# Patient Record
Sex: MALE | Race: WHITE | NOT HISPANIC OR LATINO
[De-identification: names, ages, dates, MRNs, and addresses within clinical notes are randomized per-mention and may not be internally consistent; named-entity substitution may affect disease eponyms.]

---

## 2019-12-27 PROBLEM — Z00.00 ENCOUNTER FOR PREVENTIVE HEALTH EXAMINATION: Status: ACTIVE | Noted: 2019-12-27

## 2020-07-06 ENCOUNTER — TRANSCRIPTION ENCOUNTER (OUTPATIENT)
Age: 70
End: 2020-07-06

## 2023-06-25 ENCOUNTER — TRANSCRIPTION ENCOUNTER (OUTPATIENT)
Age: 73
End: 2023-06-25

## 2023-06-25 ENCOUNTER — INPATIENT (INPATIENT)
Facility: HOSPITAL | Age: 73
LOS: 1 days | Discharge: HOME HEALTH | DRG: 42 | End: 2023-06-27
Attending: PSYCHIATRY & NEUROLOGY | Admitting: PSYCHIATRY & NEUROLOGY
Payer: MEDICARE

## 2023-06-25 VITALS
HEIGHT: 69 IN | RESPIRATION RATE: 18 BRPM | WEIGHT: 147.93 LBS | TEMPERATURE: 98 F | HEART RATE: 66 BPM | SYSTOLIC BLOOD PRESSURE: 188 MMHG | DIASTOLIC BLOOD PRESSURE: 91 MMHG | OXYGEN SATURATION: 99 %

## 2023-06-25 DIAGNOSIS — I63.9 CEREBRAL INFARCTION, UNSPECIFIED: ICD-10-CM

## 2023-06-25 DIAGNOSIS — E78.5 HYPERLIPIDEMIA, UNSPECIFIED: ICD-10-CM

## 2023-06-25 DIAGNOSIS — R73.03 PREDIABETES: ICD-10-CM

## 2023-06-25 DIAGNOSIS — Z91.018 ALLERGY TO OTHER FOODS: ICD-10-CM

## 2023-06-25 DIAGNOSIS — H53.461 HOMONYMOUS BILATERAL FIELD DEFECTS, RIGHT SIDE: ICD-10-CM

## 2023-06-25 DIAGNOSIS — I10 ESSENTIAL (PRIMARY) HYPERTENSION: ICD-10-CM

## 2023-06-25 DIAGNOSIS — Z79.899 OTHER LONG TERM (CURRENT) DRUG THERAPY: ICD-10-CM

## 2023-06-25 DIAGNOSIS — K21.9 GASTRO-ESOPHAGEAL REFLUX DISEASE WITHOUT ESOPHAGITIS: ICD-10-CM

## 2023-06-25 DIAGNOSIS — R29.702 NIHSS SCORE 2: ICD-10-CM

## 2023-06-25 DIAGNOSIS — Z79.82 LONG TERM (CURRENT) USE OF ASPIRIN: ICD-10-CM

## 2023-06-25 DIAGNOSIS — Z98.49 CATARACT EXTRACTION STATUS, UNSPECIFIED EYE: Chronic | ICD-10-CM

## 2023-06-25 DIAGNOSIS — Z98.890 OTHER SPECIFIED POSTPROCEDURAL STATES: Chronic | ICD-10-CM

## 2023-06-25 LAB
ALBUMIN SERPL ELPH-MCNC: 4.5 G/DL — SIGNIFICANT CHANGE UP (ref 3.5–5.2)
ALP SERPL-CCNC: 88 U/L — SIGNIFICANT CHANGE UP (ref 30–115)
ALT FLD-CCNC: 19 U/L — SIGNIFICANT CHANGE UP (ref 0–41)
ANION GAP SERPL CALC-SCNC: 12 MMOL/L — SIGNIFICANT CHANGE UP (ref 7–14)
APTT BLD: 26.6 SEC — LOW (ref 27–39.2)
AST SERPL-CCNC: 21 U/L — SIGNIFICANT CHANGE UP (ref 0–41)
BASOPHILS # BLD AUTO: 0.03 K/UL — SIGNIFICANT CHANGE UP (ref 0–0.2)
BASOPHILS NFR BLD AUTO: 0.5 % — SIGNIFICANT CHANGE UP (ref 0–1)
BILIRUB SERPL-MCNC: 0.4 MG/DL — SIGNIFICANT CHANGE UP (ref 0.2–1.2)
BUN SERPL-MCNC: 16 MG/DL — SIGNIFICANT CHANGE UP (ref 10–20)
CALCIUM SERPL-MCNC: 8.7 MG/DL — SIGNIFICANT CHANGE UP (ref 8.4–10.5)
CHLORIDE SERPL-SCNC: 107 MMOL/L — SIGNIFICANT CHANGE UP (ref 98–110)
CO2 SERPL-SCNC: 24 MMOL/L — SIGNIFICANT CHANGE UP (ref 17–32)
CREAT SERPL-MCNC: 1 MG/DL — SIGNIFICANT CHANGE UP (ref 0.7–1.5)
EGFR: 79 ML/MIN/1.73M2 — SIGNIFICANT CHANGE UP
EOSINOPHIL # BLD AUTO: 0.03 K/UL — SIGNIFICANT CHANGE UP (ref 0–0.7)
EOSINOPHIL NFR BLD AUTO: 0.5 % — SIGNIFICANT CHANGE UP (ref 0–8)
GLUCOSE SERPL-MCNC: 104 MG/DL — HIGH (ref 70–99)
HCT VFR BLD CALC: 42.4 % — SIGNIFICANT CHANGE UP (ref 42–52)
HGB BLD-MCNC: 14.9 G/DL — SIGNIFICANT CHANGE UP (ref 14–18)
IMM GRANULOCYTES NFR BLD AUTO: 0.2 % — SIGNIFICANT CHANGE UP (ref 0.1–0.3)
INR BLD: 0.97 RATIO — SIGNIFICANT CHANGE UP (ref 0.65–1.3)
LYMPHOCYTES # BLD AUTO: 1.12 K/UL — LOW (ref 1.2–3.4)
LYMPHOCYTES # BLD AUTO: 17.6 % — LOW (ref 20.5–51.1)
MCHC RBC-ENTMCNC: 32.5 PG — HIGH (ref 27–31)
MCHC RBC-ENTMCNC: 35.1 G/DL — SIGNIFICANT CHANGE UP (ref 32–37)
MCV RBC AUTO: 92.4 FL — SIGNIFICANT CHANGE UP (ref 80–94)
MONOCYTES # BLD AUTO: 0.51 K/UL — SIGNIFICANT CHANGE UP (ref 0.1–0.6)
MONOCYTES NFR BLD AUTO: 8 % — SIGNIFICANT CHANGE UP (ref 1.7–9.3)
NEUTROPHILS # BLD AUTO: 4.67 K/UL — SIGNIFICANT CHANGE UP (ref 1.4–6.5)
NEUTROPHILS NFR BLD AUTO: 73.2 % — SIGNIFICANT CHANGE UP (ref 42.2–75.2)
NRBC # BLD: 0 /100 WBCS — SIGNIFICANT CHANGE UP (ref 0–0)
PLATELET # BLD AUTO: 205 K/UL — SIGNIFICANT CHANGE UP (ref 130–400)
PMV BLD: 10.7 FL — HIGH (ref 7.4–10.4)
POTASSIUM SERPL-MCNC: 4.3 MMOL/L — SIGNIFICANT CHANGE UP (ref 3.5–5)
POTASSIUM SERPL-SCNC: 4.3 MMOL/L — SIGNIFICANT CHANGE UP (ref 3.5–5)
PROT SERPL-MCNC: 7 G/DL — SIGNIFICANT CHANGE UP (ref 6–8)
PROTHROM AB SERPL-ACNC: 11 SEC — SIGNIFICANT CHANGE UP (ref 9.95–12.87)
RBC # BLD: 4.59 M/UL — LOW (ref 4.7–6.1)
RBC # FLD: 13 % — SIGNIFICANT CHANGE UP (ref 11.5–14.5)
SODIUM SERPL-SCNC: 143 MMOL/L — SIGNIFICANT CHANGE UP (ref 135–146)
TROPONIN T SERPL-MCNC: <0.01 NG/ML — SIGNIFICANT CHANGE UP
WBC # BLD: 6.37 K/UL — SIGNIFICANT CHANGE UP (ref 4.8–10.8)
WBC # FLD AUTO: 6.37 K/UL — SIGNIFICANT CHANGE UP (ref 4.8–10.8)

## 2023-06-25 PROCEDURE — 83695 ASSAY OF LIPOPROTEIN(A): CPT

## 2023-06-25 PROCEDURE — 70498 CT ANGIOGRAPHY NECK: CPT | Mod: 26,MA

## 2023-06-25 PROCEDURE — G1004: CPT

## 2023-06-25 PROCEDURE — 97116 GAIT TRAINING THERAPY: CPT | Mod: GP

## 2023-06-25 PROCEDURE — 80061 LIPID PANEL: CPT

## 2023-06-25 PROCEDURE — 36415 COLL VENOUS BLD VENIPUNCTURE: CPT

## 2023-06-25 PROCEDURE — 92610 EVALUATE SWALLOWING FUNCTION: CPT | Mod: GN

## 2023-06-25 PROCEDURE — C1764: CPT

## 2023-06-25 PROCEDURE — 84100 ASSAY OF PHOSPHORUS: CPT

## 2023-06-25 PROCEDURE — 86901 BLOOD TYPING SEROLOGIC RH(D): CPT

## 2023-06-25 PROCEDURE — 87635 SARS-COV-2 COVID-19 AMP PRB: CPT

## 2023-06-25 PROCEDURE — 86850 RBC ANTIBODY SCREEN: CPT

## 2023-06-25 PROCEDURE — 83036 HEMOGLOBIN GLYCOSYLATED A1C: CPT

## 2023-06-25 PROCEDURE — 99285 EMERGENCY DEPT VISIT HI MDM: CPT | Mod: GC

## 2023-06-25 PROCEDURE — 85025 COMPLETE CBC W/AUTO DIFF WBC: CPT

## 2023-06-25 PROCEDURE — 97162 PT EVAL MOD COMPLEX 30 MIN: CPT | Mod: GP

## 2023-06-25 PROCEDURE — 0042T: CPT | Mod: MA

## 2023-06-25 PROCEDURE — 85027 COMPLETE CBC AUTOMATED: CPT

## 2023-06-25 PROCEDURE — 70551 MRI BRAIN STEM W/O DYE: CPT | Mod: 26

## 2023-06-25 PROCEDURE — 82607 VITAMIN B-12: CPT

## 2023-06-25 PROCEDURE — 93306 TTE W/DOPPLER COMPLETE: CPT

## 2023-06-25 PROCEDURE — 80048 BASIC METABOLIC PNL TOTAL CA: CPT

## 2023-06-25 PROCEDURE — 33285 INSJ SUBQ CAR RHYTHM MNTR: CPT

## 2023-06-25 PROCEDURE — 83735 ASSAY OF MAGNESIUM: CPT

## 2023-06-25 PROCEDURE — 84443 ASSAY THYROID STIM HORMONE: CPT

## 2023-06-25 PROCEDURE — 97530 THERAPEUTIC ACTIVITIES: CPT | Mod: GP

## 2023-06-25 PROCEDURE — 97167 OT EVAL HIGH COMPLEX 60 MIN: CPT | Mod: GO

## 2023-06-25 PROCEDURE — 70496 CT ANGIOGRAPHY HEAD: CPT | Mod: 26,MA

## 2023-06-25 PROCEDURE — 70551 MRI BRAIN STEM W/O DYE: CPT | Mod: ME

## 2023-06-25 PROCEDURE — 70450 CT HEAD/BRAIN W/O DYE: CPT | Mod: 26,MA,59

## 2023-06-25 PROCEDURE — 86900 BLOOD TYPING SEROLOGIC ABO: CPT

## 2023-06-25 PROCEDURE — 86803 HEPATITIS C AB TEST: CPT

## 2023-06-25 RX ORDER — ENOXAPARIN SODIUM 100 MG/ML
40 INJECTION SUBCUTANEOUS EVERY 24 HOURS
Refills: 0 | Status: DISCONTINUED | OUTPATIENT
Start: 2023-06-25 | End: 2023-06-27

## 2023-06-25 RX ORDER — PANTOPRAZOLE SODIUM 20 MG/1
40 TABLET, DELAYED RELEASE ORAL
Refills: 0 | Status: DISCONTINUED | OUTPATIENT
Start: 2023-06-25 | End: 2023-06-27

## 2023-06-25 RX ORDER — CLOPIDOGREL BISULFATE 75 MG/1
300 TABLET, FILM COATED ORAL ONCE
Refills: 0 | Status: COMPLETED | OUTPATIENT
Start: 2023-06-25 | End: 2023-06-25

## 2023-06-25 RX ORDER — ATORVASTATIN CALCIUM 80 MG/1
80 TABLET, FILM COATED ORAL AT BEDTIME
Refills: 0 | Status: DISCONTINUED | OUTPATIENT
Start: 2023-06-25 | End: 2023-06-27

## 2023-06-25 RX ORDER — ASPIRIN/CALCIUM CARB/MAGNESIUM 324 MG
325 TABLET ORAL ONCE
Refills: 0 | Status: COMPLETED | OUTPATIENT
Start: 2023-06-25 | End: 2023-06-25

## 2023-06-25 RX ADMIN — PANTOPRAZOLE SODIUM 40 MILLIGRAM(S): 20 TABLET, DELAYED RELEASE ORAL at 18:37

## 2023-06-25 RX ADMIN — Medication 1 TABLET(S): at 18:37

## 2023-06-25 RX ADMIN — ATORVASTATIN CALCIUM 80 MILLIGRAM(S): 80 TABLET, FILM COATED ORAL at 22:40

## 2023-06-25 RX ADMIN — CLOPIDOGREL BISULFATE 300 MILLIGRAM(S): 75 TABLET, FILM COATED ORAL at 18:36

## 2023-06-25 RX ADMIN — Medication 325 MILLIGRAM(S): at 18:35

## 2023-06-25 NOTE — ED PROVIDER NOTE - OBJECTIVE STATEMENT
74yo male PMHx HLD and TIA in 2020 ("double vision") presenting with perioral numbness x4 days after having a dental implant placed. No weakness of the face, no head or facial pain, no other numbness or weakness in the body, no difficulty speaking.

## 2023-06-25 NOTE — ED PROVIDER NOTE - PHYSICAL EXAMINATION
VITAL SIGNS: I have reviewed nursing notes and confirm.  CONSTITUTIONAL: Well-appearing, non-toxic, in NAD  SKIN: Warm dry, normal skin turgor  HEAD: NCAT  EYES: No conjunctival injection, scleral anicteric  ENT: Moist mucous membranes, normal pharynx with no erythema or exudates  NECK: Supple; full ROM. Nontender. No cervical LAD  CARD: RRR, no murmurs, rubs or gallops  RESP: Clear to ausculation bilaterally.  No rales, rhonchi, or wheezing.  ABD: Soft, non-distended, non-tender, no rebound or guarding. No CVA tenderness  EXT: Full ROM, no bony tenderness, no pedal edema, no calf tenderness  NEURO: Normal motor. Subjective decreased sensation to touch of the right corner of the mouth and philthrum, but discrimination intact. CN II-XII otherwise grossly intact. No pronator drift. Normal gait.  PSYCH: Cooperative, appropriate.

## 2023-06-25 NOTE — H&P ADULT - ASSESSMENT
This is 74yo male with pmhx of HLD, GERD and ?TIA in 2020 while in [place]("double vision") presenting with right sided perioral numbness for last 4 days. NIHSS 1 on arrival. Found to have an acute infarct in left thalamic area on CTH, most likely due to small vessel disease. Currenlty admitted under neurology for further work up.     #Left Thalamic Stroke  - CTH: Acute Left Thalamic infarct  - CTA:  - Load with ASA and Plavix  - c/w DAPT for 21 days then aspirin thereafter  - Obtain TTE  - Obtain A1c, TSH, Lipid Panel  - Obtain MR brain non cont   - Start Lipitor 80mg daily  - SBP goal 120-160 for now  - PT/Physiatry eval  - Speech and swallow eval    #HLD  - Start Lipitor 80mg daily    #GERD  - c/w home omeprazole 40mg daily      Misc  Diet: DASH  DVT ppx: Lovenox  GI ppx: Protonix  Activity: IAT  Code: Full    Dispo: Home     This is 72yo male with pmhx of HLD, GERD and ?TIA in 2020 while in [place]("double vision") presenting with right sided perioral numbness for last 4 days. NIHSS 1 on arrival. Found to have an acute infarct in left thalamic area on CTH, most likely due to small vessel disease. Currenlty admitted under neurology for further work up.     #Left Thalamic Stroke  - CTH: Acute Left Thalamic infarct  - CTA: No vascular occlusion, aneurysm or malformation  - Load with ASA and Plavix  - c/w DAPT for 21 days then aspirin thereafter  - Obtain TTE  - Obtain A1c, TSH, Lipid Panel  - Obtain MR brain non cont   - Start Lipitor 80mg daily  - SBP goal 120-160 for now  - PT/Physiatry eval  - Speech and swallow eval    #HLD  - Start Lipitor 80mg daily    #GERD  - c/w home omeprazole 40mg daily      Misc  Diet: DASH  DVT ppx: Lovenox  GI ppx: Protonix  Activity: IAT  Code: Full    Dispo: Home     This is 74yo male with pmhx of HLD, GERD and ?TIA in 2020 while in [place]("double vision") presenting with right sided perioral numbness for last 4 days. NIHSS 1 on arrival. Found to have an acute infarct in left thalamic area on CTH, most likely cardioembolic vs small vessel disease. Currenlty admitted under neurology for further work up.     #Left Thalamic Stroke  - CTH: Acute Left Thalamic infarct  - CTA: No vascular occlusion, aneurysm or malformation  - Load with ASA and Plavix  - c/w DAPT for 21 days then aspirin thereafter  - Obtain TTE  - Obtain A1c, TSH, Lipid Panel  - Obtain MR brain non cont   - Start Lipitor 80mg daily  - SBP goal 120-160 for now  - PT/Physiatry eval  - Speech and swallow eval    #HLD  - Start Lipitor 80mg daily    #GERD  - c/w home omeprazole 40mg daily      Misc  Diet: DASH  DVT ppx: Lovenox  GI ppx: Protonix  Activity: IAT  Code: Full    Dispo: Home

## 2023-06-25 NOTE — ED ADULT NURSE NOTE - NSFALLUNIVINTERV_ED_ALL_ED
Bed/Stretcher in lowest position, wheels locked, appropriate side rails in place/Call bell, personal items and telephone in reach/Instruct patient to call for assistance before getting out of bed/chair/stretcher/Non-slip footwear applied when patient is off stretcher/[place] to call system/Physically safe environment - no spills, clutter or unnecessary equipment/Purposeful proactive rounding/Room/bathroom lighting operational, light cord in reach

## 2023-06-25 NOTE — ED PROVIDER NOTE - CLINICAL SUMMARY MEDICAL DECISION MAKING FREE TEXT BOX
73-year-old man, history of hyperlipidemia, TIA in 2020 complains of numbness to the right perioral region 4 days after having dental implant.  No other deficits.  Pt's Wife is undergoing treatment for pancreatic CA at [facility] so patient came to the ED for evaluation prior to Wife's next appointment which is scheduled for later in the week in [place].  Vital signs, exam as noted, no focal deficits.  Labs okay, but head CT consistent with acute infarct.  Results were discussed with patient, neurology was consulted, and he was admitted to stroke unit for further evaluation and treatment.

## 2023-06-25 NOTE — H&P ADULT - NSHPLABSRESULTS_GEN_ALL_CORE
LABS:                        14.9   6.37  )-----------( 205      ( 25 Jun 2023 14:22 )             42.4     06-25    143  |  107  |  16  ----------------------------<  104<H>  4.3   |  24  |  1.0    Ca    8.7      25 Jun 2023 14:22    TPro  7.0  /  Alb  4.5  /  TBili  0.4  /  DBili  x   /  AST  21  /  ALT  19  /  AlkPhos  88  06-25    PT/INR - ( 25 Jun 2023 14:22 )   PT: 11.00 sec;   INR: 0.97 ratio         PTT - ( 25 Jun 2023 14:22 )  PTT:26.6 sec    < from: CT Angio Neck w/ IV Cont (06.25.23 @ 16:56) >    No vascular occlusion, aneurysm or malformation.    Redemonstrated focal hypodensity in the left thalamus, likely infarction.   Can correlate with MRI if warranted.    < end of copied text >    < from: CT Head No Cont (06.25.23 @ 13:54) >    Hypodensity involving the left thalamus concerning for an acute infarct.   No acute hemorrhage.    < end of copied text >

## 2023-06-25 NOTE — H&P ADULT - NSICDXFAMILYHX_GEN_ALL_CORE_FT
FAMILY HISTORY:  Father  Still living? No  Family history of emphysema, Age at diagnosis: Age Unknown    Mother  Still living? No  Family history of CHF (congestive heart failure), Age at diagnosis: Age Unknown

## 2023-06-25 NOTE — H&P ADULT - ATTENDING COMMENTS
Pt is a 74 yo M with PMH of HTN, HLD, prior vertical diplopia (treated with prednisone while in [place] and it improved, uncertain if imaging showed a stroke at that time), who presented with right perioral and right thumb hypoesthesia. NIHSS 2 for right homonymous hemianopia.     Impr: scattered left PCA territory acute ischemic strokes (thalamus, occipital/temporal region)  CTA head/neck with left P2 stenosis vs occlusion with distal reconstitution  TTE with EF 60-65%  Etiology: likely to ICAD, however may also consider cardioembolic  Recommend ILR placement  PTA: none-> DAPT x 90 days then ASA monotherapy and high intensity statin  Discussed driving restrictions  PT/OT/ST, tele, -160, dispo planning

## 2023-06-25 NOTE — H&P ADULT - HISTORY OF PRESENT ILLNESS
72yo male PMHx HLD, GERD and ?TIA in 2020 while in [place]("double vision") presenting with right sided perioral numbness for last 4 days. Patient reports undergoing root canal w/ possible dental implant placed. After the procedure patient reports feeling some numbness in his right lower lips which evolved over the last days to the upper lip and right side lower jaw area. This morning, patient reports feeling some numbness in his right thumb area. Denies any other symptoms. No weakness of the face, no head or facial pain, no difficulty speaking, no focal weakness, no difficulty ambulating or falls. No nausea or vomiting. Reports feeling fine otherwise.  In ED, vitals: /91 HR 66 RR 18 T 98  CTH: acute left thalamus infarct  CTA: no LVO or significant stenosis - prelim read    Patient admitted under neurology for stroke work up.

## 2023-06-25 NOTE — H&P ADULT - NSHPPHYSICALEXAM_GEN_ALL_CORE
PHYSICAL EXAMINATION:  General: Well-developed, well nourished, in no acute distress.  Eyes: Conjunctiva and sclera clear.  Neurologic:  - Mental Status:  Alert, awake, oriented to person, place, and time; Speech is fluent with intact naming, repetition, and comprehension; Good overall fund of knowledge;   - Cranial Nerves II-XII:    II:  Visual fields are full to confronation; Pupils are equal, round, and reactive to light;  III, IV, VI:  Extraocular movements are intact without nystagmus.  V:  decreased right sided facial sensation in v2,v3 perioral area  VII:  Face is symmetric with normal eye closure and smile  VIII:  Hearing is intact to finger rub.  IX, X:  Uvula is midline and soft palate rises symmetrically  XI:  Head turning and shoulder shrug are intact.  XII:  Tongue protrudes in the midline.  - Motor:  Strength is 5/5 throughout.  There is no prontator drift.  Normal muscle bulk and tone throughout.  - Reflexes:  2+ and symmetric at the biceps, triceps, brachioradialis, knees, and ankles.  Plantar responses flexor.  - Sensory:  Intact throughout to vibration, mild numbness in right thumb  - Coordination:  Finger-nose-finger  without dysmetria.  Rapid alternating hand movements intact.  - Gait: Narrow based  NIHSS 1
[Follow-Up] : a follow-up visit for
[Minimal Change Nephrosis] : minimal change nephrosis
[Mother] : mother
[Patient] : patient
,DirectAddress_Unknown,DirectAddress_Unknown,DirectAddress_Unknown

## 2023-06-26 LAB
A1C WITH ESTIMATED AVERAGE GLUCOSE RESULT: 6 % — HIGH (ref 4–5.6)
ANION GAP SERPL CALC-SCNC: 13 MMOL/L — SIGNIFICANT CHANGE UP (ref 7–14)
BASOPHILS # BLD AUTO: 0.05 K/UL — SIGNIFICANT CHANGE UP (ref 0–0.2)
BASOPHILS NFR BLD AUTO: 0.7 % — SIGNIFICANT CHANGE UP (ref 0–1)
BUN SERPL-MCNC: 16 MG/DL — SIGNIFICANT CHANGE UP (ref 10–20)
CALCIUM SERPL-MCNC: 8.5 MG/DL — SIGNIFICANT CHANGE UP (ref 8.4–10.5)
CHLORIDE SERPL-SCNC: 107 MMOL/L — SIGNIFICANT CHANGE UP (ref 98–110)
CHOLEST SERPL-MCNC: 233 MG/DL — HIGH
CO2 SERPL-SCNC: 25 MMOL/L — SIGNIFICANT CHANGE UP (ref 17–32)
CREAT SERPL-MCNC: 1 MG/DL — SIGNIFICANT CHANGE UP (ref 0.7–1.5)
EGFR: 79 ML/MIN/1.73M2 — SIGNIFICANT CHANGE UP
EOSINOPHIL # BLD AUTO: 0.31 K/UL — SIGNIFICANT CHANGE UP (ref 0–0.7)
EOSINOPHIL NFR BLD AUTO: 4.6 % — SIGNIFICANT CHANGE UP (ref 0–8)
ESTIMATED AVERAGE GLUCOSE: 126 MG/DL — HIGH (ref 68–114)
GLUCOSE SERPL-MCNC: 87 MG/DL — SIGNIFICANT CHANGE UP (ref 70–99)
HCT VFR BLD CALC: 38.8 % — LOW (ref 42–52)
HCV AB S/CO SERPL IA: 0.04 COI — SIGNIFICANT CHANGE UP
HCV AB SERPL-IMP: SIGNIFICANT CHANGE UP
HDLC SERPL-MCNC: 46 MG/DL — SIGNIFICANT CHANGE UP
HGB BLD-MCNC: 13.9 G/DL — LOW (ref 14–18)
IMM GRANULOCYTES NFR BLD AUTO: 0.3 % — SIGNIFICANT CHANGE UP (ref 0.1–0.3)
LIPID PNL WITH DIRECT LDL SERPL: 169 MG/DL — HIGH
LYMPHOCYTES # BLD AUTO: 1.74 K/UL — SIGNIFICANT CHANGE UP (ref 1.2–3.4)
LYMPHOCYTES # BLD AUTO: 25.8 % — SIGNIFICANT CHANGE UP (ref 20.5–51.1)
MCHC RBC-ENTMCNC: 32.8 PG — HIGH (ref 27–31)
MCHC RBC-ENTMCNC: 35.8 G/DL — SIGNIFICANT CHANGE UP (ref 32–37)
MCV RBC AUTO: 91.5 FL — SIGNIFICANT CHANGE UP (ref 80–94)
MONOCYTES # BLD AUTO: 0.67 K/UL — HIGH (ref 0.1–0.6)
MONOCYTES NFR BLD AUTO: 9.9 % — HIGH (ref 1.7–9.3)
NEUTROPHILS # BLD AUTO: 3.96 K/UL — SIGNIFICANT CHANGE UP (ref 1.4–6.5)
NEUTROPHILS NFR BLD AUTO: 58.7 % — SIGNIFICANT CHANGE UP (ref 42.2–75.2)
NON HDL CHOLESTEROL: 187 MG/DL — HIGH
NRBC # BLD: 0 /100 WBCS — SIGNIFICANT CHANGE UP (ref 0–0)
PLATELET # BLD AUTO: 181 K/UL — SIGNIFICANT CHANGE UP (ref 130–400)
PMV BLD: 11.4 FL — HIGH (ref 7.4–10.4)
POTASSIUM SERPL-MCNC: 4.2 MMOL/L — SIGNIFICANT CHANGE UP (ref 3.5–5)
POTASSIUM SERPL-SCNC: 4.2 MMOL/L — SIGNIFICANT CHANGE UP (ref 3.5–5)
RBC # BLD: 4.24 M/UL — LOW (ref 4.7–6.1)
RBC # FLD: 13.1 % — SIGNIFICANT CHANGE UP (ref 11.5–14.5)
SARS-COV-2 RNA SPEC QL NAA+PROBE: SIGNIFICANT CHANGE UP
SODIUM SERPL-SCNC: 145 MMOL/L — SIGNIFICANT CHANGE UP (ref 135–146)
TRIGL SERPL-MCNC: 90 MG/DL — SIGNIFICANT CHANGE UP
TSH SERPL-MCNC: 6.94 UIU/ML — HIGH (ref 0.27–4.2)
WBC # BLD: 6.75 K/UL — SIGNIFICANT CHANGE UP (ref 4.8–10.8)
WBC # FLD AUTO: 6.75 K/UL — SIGNIFICANT CHANGE UP (ref 4.8–10.8)

## 2023-06-26 PROCEDURE — 99223 1ST HOSP IP/OBS HIGH 75: CPT

## 2023-06-26 PROCEDURE — 99223 1ST HOSP IP/OBS HIGH 75: CPT | Mod: 57,25

## 2023-06-26 PROCEDURE — 93306 TTE W/DOPPLER COMPLETE: CPT | Mod: 26

## 2023-06-26 PROCEDURE — 33285 INSJ SUBQ CAR RHYTHM MNTR: CPT

## 2023-06-26 RX ORDER — CLOPIDOGREL BISULFATE 75 MG/1
75 TABLET, FILM COATED ORAL DAILY
Refills: 0 | Status: DISCONTINUED | OUTPATIENT
Start: 2023-06-26 | End: 2023-06-27

## 2023-06-26 RX ORDER — ASPIRIN/CALCIUM CARB/MAGNESIUM 324 MG
81 TABLET ORAL DAILY
Refills: 0 | Status: DISCONTINUED | OUTPATIENT
Start: 2023-06-26 | End: 2023-06-27

## 2023-06-26 RX ADMIN — Medication 1 TABLET(S): at 11:37

## 2023-06-26 RX ADMIN — CLOPIDOGREL BISULFATE 75 MILLIGRAM(S): 75 TABLET, FILM COATED ORAL at 11:36

## 2023-06-26 RX ADMIN — Medication 81 MILLIGRAM(S): at 11:36

## 2023-06-26 RX ADMIN — ATORVASTATIN CALCIUM 80 MILLIGRAM(S): 80 TABLET, FILM COATED ORAL at 21:34

## 2023-06-26 RX ADMIN — PANTOPRAZOLE SODIUM 40 MILLIGRAM(S): 20 TABLET, DELAYED RELEASE ORAL at 05:41

## 2023-06-26 RX ADMIN — ENOXAPARIN SODIUM 40 MILLIGRAM(S): 100 INJECTION SUBCUTANEOUS at 05:41

## 2023-06-26 NOTE — CONSULT NOTE ADULT - SUBJECTIVE AND OBJECTIVE BOX
Patient is a 73y old  Male who presents with a chief complaint of Left PCA Stroke (2023 15:21)        HPI:  74yo male PMHx HLD, GERD and ?TIA in  while in [place]("double vision") presenting with right sided perioral numbness for last 4 days. Patient reports undergoing root canal w/ possible dental implant placed. After the procedure patient reports feeling some numbness in his right lower lips which evolved over the last days to the upper lip and right side lower jaw area. This morning, patient reports feeling some numbness in his right thumb area. Denies any other symptoms. No weakness of the face, no head or facial pain, no difficulty speaking, no focal weakness, no difficulty ambulating or falls. No nausea or vomiting. Reports feeling fine otherwise.  In ED, vitals: /91 HR 66 RR 18 T 98  CTH: acute left thalamus infarct  CTA: no LVO or significant stenosis - prelim read    Patient admitted under neurology for stroke work up.  (2023 17:41)      Electrophysiology:  73y Male    REVIEW OF SYSTEMS    [ ] A ten-point review of systems was otherwise negative except as noted.  [ ] Due to altered mental status/intubation, subjective information were not able to be obtained from the patient. History was obtained, to the extent possible, from review of the chart and collateral sources of information.      PAST MEDICAL & SURGICAL HISTORY:  Hyperlipidemia      H/O hernia repair      S/P cataract surgery          Home Medications:  Multiple Vitamins oral tablet: 1 orally once a day (2023 18:15)  omeprazole 40 mg oral delayed release capsule: 1 orally once a day (2023 18:15)      Allergies:  apple (Hives; Urticaria)  Cherries (Anaphylaxis; Rash; Urticaria; Hives)  Nuts (Unknown)  No Known Drug Allergies      FAMILY HISTORY:  Family history of CHF (congestive heart failure) (Mother)    Family history of emphysema (Father)        SOCIAL HISTORY:    CIGARETTES:  ALCOHOL:  MARIJUANA:  ILLICIT DRUGS:        PREVIOUS DIAGNOSTIC TESTING:      ECHO  FINDINGS:    STRESS  FINDINGS:    CATHETERIZATION  FINDINGS:    ELECTROPHYSIOLOGY STUDY  FINDINGS:    CAROTID ULTRASOUND:  FINDINGS    VENOUS DUPLEX SCAN:  FINDINGS:    CHEST CT PULMONARY ANGIO with IV Contrast:  FINDINGS:      MEDICATIONS  (STANDING):  aspirin  chewable 81 milliGRAM(s) Oral daily  atorvastatin 80 milliGRAM(s) Oral at bedtime  clopidogrel Tablet 75 milliGRAM(s) Oral daily  enoxaparin Injectable 40 milliGRAM(s) SubCutaneous every 24 hours  multivitamin 1 Tablet(s) Oral daily  pantoprazole    Tablet 40 milliGRAM(s) Oral before breakfast    MEDICATIONS  (PRN):      Vital Signs Last 24 Hrs  T(C): 36.7 (2023 16:06), Max: 36.7 (2023 16:06)  T(F): 98 (2023 16:06), Max: 98 (2023 16:06)  HR: 72 (2023 16:06) (53 - 72)  BP: 131/81 (2023 16:06) (131/81 - 182/85)  BP(mean): 97 (2023 05:30) (97 - 129)  RR: 18 (2023 16:06) (18 - 18)  SpO2: 95% (2023 16:06) (94% - 100%)    Parameters below as of 2023 16:06  Patient On (Oxygen Delivery Method): room air        PHYSICAL EXAM:    GENERAL: In no apparent distress, well nourished, and hydrated.  HEAD:  Atraumatic, Normocephalic  EYES: EOMI, PERRLA, conjunctiva and sclera clear  NECK: Supple and normal thyroid.  No JVD or carotid bruit.  Carotid pulse is 2+ bilaterally.  HEART: Regular rate and rhythm; No murmurs, rubs, or gallops.  PULMONARY: Clear to auscultation and perfusion.  No rales, wheezing, or rhonchi bilaterally.  ABDOMEN: Soft, Nontender, Nondistended; Bowel sounds present  EXTREMITIES:  2+ Peripheral Pulses, No clubbing, cyanosis, or edema  NEUROLOGICAL: Grossly nonfocal    I&O's Detail    Daily     Daily     INTERPRETATION OF TELEMETRY:    EC Lead ECG:   Ventricular Rate 53 BPM    Atrial Rate 53 BPM    P-R Interval 178 ms    QRS Duration 82 ms    Q-T Interval 444 ms    QTC Calculation(Bazett) 416 ms    P Axis 23 degrees    R Axis 27 degrees    T Axis 14 degrees    Diagnosis Line Sinus bradycardia  Possible Inferior infarct , age undetermined  Abnormal ECG    Confirmed by Juan Simons ([id number]) on 2023 4:15:55 PM (23 @ 15:16)        LABS:                        13.9   6.75  )-----------( 181      ( 2023 05:55 )             38.8         145  |  107  |  16  ----------------------------<  87  4.2   |  25  |  1.0    Ca    8.5      2023 05:55    TPro  7.0  /  Alb  4.5  /  TBili  0.4  /  DBili  x   /  AST  21  /  ALT  19  /  AlkPhos  88      CARDIAC MARKERS ( 2023 14:22 )  x     / <0.01 ng/mL / x     / x     / x          PT/INR - ( 2023 14:22 )   PT: 11.00 sec;   INR: 0.97 ratio         PTT - ( 2023 14:22 )  PTT:26.6 sec  Urinalysis Basic - ( 2023 05:55 )    Color: x / Appearance: x / SG: x / pH: x  Gluc: 87 mg/dL / Ketone: x  / Bili: x / Urobili: x   Blood: x / Protein: x / Nitrite: x   Leuk Esterase: x / RBC: x / WBC x   Sq Epi: x / Non Sq Epi: x / Bacteria: x      BNP  Thyroid Stimulating Hormone, Serum: 6.94 uIU/mL [0.27 - 4.20] (23 @ 05:55)      COVID-19 PCR: NotDetec (23 @ 09:39)        RADIOLOGY & ADDITIONAL STUDIES:       Patient is a 73y old  Male who presents with a chief complaint of Left PCA Stroke (2023 15:21)        HPI:  74yo male PMHx HLD, GERD and ?TIA in  while in [place]("double vision") presenting with right sided perioral numbness for last 4 days. Patient reports undergoing root canal w/ possible dental implant placed. After the procedure patient reports feeling some numbness in his right lower lips which evolved over the last days to the upper lip and right side lower jaw area. This morning, patient reports feeling some numbness in his right thumb area. Denies any other symptoms. No weakness of the face, no head or facial pain, no difficulty speaking, no focal weakness, no difficulty ambulating or falls. No nausea or vomiting. Reports feeling fine otherwise.  In ED, vitals: /91 HR 66 RR 18 T 98  CTH: acute left thalamus infarct  CTA: no LVO or significant stenosis - prelim read    Patient admitted under neurology for stroke work up.  (2023 17:41)      Electrophysiology:  73y Male, physically active, plays tennis and bikes, with h/o HLD, GERD, ?TIA     REVIEW OF SYSTEMS    [ ] A ten-point review of systems was otherwise negative except as noted.  [ ] Due to altered mental status/intubation, subjective information were not able to be obtained from the patient. History was obtained, to the extent possible, from review of the chart and collateral sources of information.      PAST MEDICAL & SURGICAL HISTORY:  Hyperlipidemia      H/O hernia repair      S/P cataract surgery          Home Medications:  Multiple Vitamins oral tablet: 1 orally once a day (2023 18:15)  omeprazole 40 mg oral delayed release capsule: 1 orally once a day (2023 18:15)      Allergies:  apple (Hives; Urticaria)  Cherries (Anaphylaxis; Rash; Urticaria; Hives)  Nuts (Unknown)  No Known Drug Allergies      FAMILY HISTORY:  Family history of CHF (congestive heart failure) (Mother)    Family history of emphysema (Father)        SOCIAL HISTORY:    CIGARETTES:  ALCOHOL:  MARIJUANA:  ILLICIT DRUGS:        PREVIOUS DIAGNOSTIC TESTING:      ECHO  FINDINGS:    STRESS  FINDINGS:    CATHETERIZATION  FINDINGS:    ELECTROPHYSIOLOGY STUDY  FINDINGS:    CAROTID ULTRASOUND:  FINDINGS    VENOUS DUPLEX SCAN:  FINDINGS:    CHEST CT PULMONARY ANGIO with IV Contrast:  FINDINGS:      MEDICATIONS  (STANDING):  aspirin  chewable 81 milliGRAM(s) Oral daily  atorvastatin 80 milliGRAM(s) Oral at bedtime  clopidogrel Tablet 75 milliGRAM(s) Oral daily  enoxaparin Injectable 40 milliGRAM(s) SubCutaneous every 24 hours  multivitamin 1 Tablet(s) Oral daily  pantoprazole    Tablet 40 milliGRAM(s) Oral before breakfast    MEDICATIONS  (PRN):      Vital Signs Last 24 Hrs  T(C): 36.7 (2023 16:06), Max: 36.7 (2023 16:06)  T(F): 98 (2023 16:06), Max: 98 (2023 16:06)  HR: 72 (2023 16:06) (53 - 72)  BP: 131/81 (2023 16:06) (131/81 - 182/85)  BP(mean): 97 (2023 05:30) (97 - 129)  RR: 18 (2023 16:06) (18 - 18)  SpO2: 95% (2023 16:06) (94% - 100%)    Parameters below as of 2023 16:06  Patient On (Oxygen Delivery Method): room air        PHYSICAL EXAM:    GENERAL: In no apparent distress, well nourished, and hydrated.  HEAD:  Atraumatic, Normocephalic  EYES: EOMI, PERRLA, conjunctiva and sclera clear  NECK: Supple and normal thyroid.  No JVD or carotid bruit.  Carotid pulse is 2+ bilaterally.  HEART: Regular rate and rhythm; No murmurs, rubs, or gallops.  PULMONARY: Clear to auscultation and perfusion.  No rales, wheezing, or rhonchi bilaterally.  ABDOMEN: Soft, Nontender, Nondistended; Bowel sounds present  EXTREMITIES:  2+ Peripheral Pulses, No clubbing, cyanosis, or edema  NEUROLOGICAL: Grossly nonfocal    I&O's Detail    Daily     Daily     INTERPRETATION OF TELEMETRY:    EC Lead ECG:   Ventricular Rate 53 BPM    Atrial Rate 53 BPM    P-R Interval 178 ms    QRS Duration 82 ms    Q-T Interval 444 ms    QTC Calculation(Bazett) 416 ms    P Axis 23 degrees    R Axis 27 degrees    T Axis 14 degrees    Diagnosis Line Sinus bradycardia  Possible Inferior infarct , age undetermined  Abnormal ECG    Confirmed by Juan Simons ([id number]) on 2023 4:15:55 PM (23 @ 15:16)        LABS:                        13.9   6.75  )-----------( 181      ( 2023 05:55 )             38.8         145  |  107  |  16  ----------------------------<  87  4.2   |  25  |  1.0    Ca    8.5      2023 05:55    TPro  7.0  /  Alb  4.5  /  TBili  0.4  /  DBili  x   /  AST  21  /  ALT  19  /  AlkPhos  88      CARDIAC MARKERS ( 2023 14:22 )  x     / <0.01 ng/mL / x     / x     / x          PT/INR - ( 2023 14:22 )   PT: 11.00 sec;   INR: 0.97 ratio         PTT - ( 2023 14:22 )  PTT:26.6 sec  Urinalysis Basic - ( 2023 05:55 )    Color: x / Appearance: x / SG: x / pH: x  Gluc: 87 mg/dL / Ketone: x  / Bili: x / Urobili: x   Blood: x / Protein: x / Nitrite: x   Leuk Esterase: x / RBC: x / WBC x   Sq Epi: x / Non Sq Epi: x / Bacteria: x      BNP  Thyroid Stimulating Hormone, Serum: 6.94 uIU/mL [0.27 - 4.20] (23 @ 05:55)      COVID-19 PCR: NotDetec (23 @ 09:39)        RADIOLOGY & ADDITIONAL STUDIES:       Patient is a 73y old  Male who presents with a chief complaint of Left PCA Stroke (2023 15:21)        HPI:  72yo male PMHx HLD, GERD and ?TIA in  while in [place]("double vision") presenting with right sided perioral numbness for last 4 days. Patient reports undergoing root canal w/ possible dental implant placed. After the procedure patient reports feeling some numbness in his right lower lips which evolved over the last days to the upper lip and right side lower jaw area. This morning, patient reports feeling some numbness in his right thumb area. Denies any other symptoms. No weakness of the face, no head or facial pain, no difficulty speaking, no focal weakness, no difficulty ambulating or falls. No nausea or vomiting. Reports feeling fine otherwise.  In ED, vitals: /91 HR 66 RR 18 T 98  CTH: acute left thalamus infarct  CTA: no LVO or significant stenosis - prelim read    Patient admitted under neurology for stroke work up.  (2023 17:41)      Electrophysiology:  73y Male, physically active, plays tennis and bikes, with h/o HLD, GERD, ?TIA  presented to face numbness. Was found acute left thalamus infarct on CT head.     REVIEW OF SYSTEMS    [ ] A ten-point review of systems was otherwise negative except as noted.  [ ] Due to altered mental status/intubation, subjective information were not able to be obtained from the patient. History was obtained, to the extent possible, from review of the chart and collateral sources of information.      PAST MEDICAL & SURGICAL HISTORY:  Hyperlipidemia      H/O hernia repair      S/P cataract surgery          Home Medications:  Multiple Vitamins oral tablet: 1 orally once a day (2023 18:15)  omeprazole 40 mg oral delayed release capsule: 1 orally once a day (2023 18:15)      Allergies:  apple (Hives; Urticaria)  Cherries (Anaphylaxis; Rash; Urticaria; Hives)  Nuts (Unknown)  No Known Drug Allergies      FAMILY HISTORY:  Family history of CHF (congestive heart failure) (Mother)    Family history of emphysema (Father)        SOCIAL HISTORY: denies tobacco / ETOH / illicit drug use     CIGARETTES:  ALCOHOL:  MARIJUANA:  ILLICIT DRUGS:        PREVIOUS DIAGNOSTIC TESTING:      ECHO  FINDINGS:  < from: TTE Echo Complete w/o Contrast w/ Doppler (23 @ 08:17) >  Summary:   1. Endocardial visualization was enhanced with intravenous echo contrast.   2. Left ventricular ejection fraction, by visual estimation, is 60 to   65%.   3. Normal global left ventricular systolic function.   4. Spectral Doppler shows impaired relaxation pattern of left   ventricular myocardial filling (Grade I diastolic dysfunction).   5. Mild aortic regurgitation.   6. Mild tricuspid regurgitation.    < end of copied text >    STRESS  FINDINGS:    CATHETERIZATION  FINDINGS:    ELECTROPHYSIOLOGY STUDY  FINDINGS:    CAROTID ULTRASOUND:  FINDINGS    VENOUS DUPLEX SCAN:  FINDINGS:    CHEST CT PULMONARY ANGIO with IV Contrast:  FINDINGS:      MEDICATIONS  (STANDING):  aspirin  chewable 81 milliGRAM(s) Oral daily  atorvastatin 80 milliGRAM(s) Oral at bedtime  clopidogrel Tablet 75 milliGRAM(s) Oral daily  enoxaparin Injectable 40 milliGRAM(s) SubCutaneous every 24 hours  multivitamin 1 Tablet(s) Oral daily  pantoprazole    Tablet 40 milliGRAM(s) Oral before breakfast    MEDICATIONS  (PRN):      Vital Signs Last 24 Hrs  T(C): 36.7 (2023 16:06), Max: 36.7 (2023 16:06)  T(F): 98 (2023 16:06), Max: 98 (2023 16:06)  HR: 72 (2023 16:06) (53 - 72)  BP: 131/81 (2023 16:06) (131/81 - 182/85)  BP(mean): 97 (2023 05:30) (97 - 129)  RR: 18 (2023 16:06) (18 - 18)  SpO2: 95% (2023 16:06) (94% - 100%)    Parameters below as of 2023 16:06  Patient On (Oxygen Delivery Method): room air        PHYSICAL EXAM:    GENERAL: In no apparent distress, well nourished, and hydrated.  HEAD:  Atraumatic, Normocephalic  EYES: EOMI, PERRLA, conjunctiva and sclera clear  NECK: Supple and normal thyroid.  No JVD or carotid bruit.  Carotid pulse is 2+ bilaterally.  HEART: Regular rate and rhythm; No murmurs, rubs, or gallops.  PULMONARY: Clear to auscultation and perfusion.  No rales, wheezing, or rhonchi bilaterally.  ABDOMEN: Soft, Nontender, Nondistended; Bowel sounds present  EXTREMITIES:  2+ Peripheral Pulses, No clubbing, cyanosis, or edema  NEUROLOGICAL: Grossly nonfocal    I&O's Detail    Daily     Daily     INTERPRETATION OF TELEMETRY:    EC Lead ECG:   Ventricular Rate 53 BPM    Atrial Rate 53 BPM    P-R Interval 178 ms    QRS Duration 82 ms    Q-T Interval 444 ms    QTC Calculation(Bazett) 416 ms    P Axis 23 degrees    R Axis 27 degrees    T Axis 14 degrees    Diagnosis Line Sinus bradycardia  Possible Inferior infarct , age undetermined  Abnormal ECG    Confirmed by Juan Simons ([id number]) on 2023 4:15:55 PM (23 @ 15:16)        LABS:                        13.9   6.75  )-----------( 181      ( 2023 05:55 )             38.8         145  |  107  |  16  ----------------------------<  87  4.2   |  25  |  1.0    Ca    8.5      2023 05:55    TPro  7.0  /  Alb  4.5  /  TBili  0.4  /  DBili  x   /  AST  21  /  ALT  19  /  AlkPhos  88      CARDIAC MARKERS ( 2023 14:22 )  x     / <0.01 ng/mL / x     / x     / x          PT/INR - ( 2023 14:22 )   PT: 11.00 sec;   INR: 0.97 ratio         PTT - ( 2023 14:22 )  PTT:26.6 sec  Urinalysis Basic - ( 2023 05:55 )    Color: x / Appearance: x / SG: x / pH: x  Gluc: 87 mg/dL / Ketone: x  / Bili: x / Urobili: x   Blood: x / Protein: x / Nitrite: x   Leuk Esterase: x / RBC: x / WBC x   Sq Epi: x / Non Sq Epi: x / Bacteria: x      BNP  Thyroid Stimulating Hormone, Serum: 6.94 uIU/mL [0.27 - 4.20] (23 @ 05:55)      COVID-19 PCR: NotDetec (23 @ 09:39)        RADIOLOGY & ADDITIONAL STUDIES:    < from: MR Head No Cont (23 @ 20:56) >  IMPRESSION:    Multiple acute infarcts involving the left thalamus, occipital and   temporal lobes consistent with acute embolic ischemic infarction. No   evidence of hemorrhage.    < end of copied text >     Patient is a 73y old  Male who presents with a chief complaint of Left PCA Stroke (2023 15:21)        HPI:  72yo male PMHx HLD, GERD and ?TIA in  while in [place]("double vision") presenting with right sided perioral numbness for last 4 days. Patient reports undergoing root canal w/ possible dental implant placed. After the procedure patient reports feeling some numbness in his right lower lips which evolved over the last days to the upper lip and right side lower jaw area. This morning, patient reports feeling some numbness in his right thumb area. Denies any other symptoms. No weakness of the face, no head or facial pain, no difficulty speaking, no focal weakness, no difficulty ambulating or falls. No nausea or vomiting. Reports feeling fine otherwise.  In ED, vitals: /91 HR 66 RR 18 T 98  CTH: acute left thalamus infarct  CTA: no LVO or significant stenosis - prelim read    Patient admitted under neurology for stroke work up.  (2023 17:41)      Electrophysiology:  73y Male, physically active, plays tennis and bikes, with h/o HLD, GERD, ?TIA  presented to face numbness. Was found acute left thalamus infarct on CT head. Subsequent MRI revealed multiple infarcts.   EP consulted for long term cardiac monitoring  Denies palpitations, dizziness, lightheadedness.    REVIEW OF SYSTEMS    [ ] A ten-point review of systems was otherwise negative except as noted.  [ ] Due to altered mental status/intubation, subjective information were not able to be obtained from the patient. History was obtained, to the extent possible, from review of the chart and collateral sources of information.      PAST MEDICAL & SURGICAL HISTORY:  Hyperlipidemia      H/O hernia repair      S/P cataract surgery          Home Medications:  Multiple Vitamins oral tablet: 1 orally once a day (2023 18:15)  omeprazole 40 mg oral delayed release capsule: 1 orally once a day (2023 18:15)      Allergies:  apple (Hives; Urticaria)  Cherries (Anaphylaxis; Rash; Urticaria; Hives)  Nuts (Unknown)  No Known Drug Allergies      FAMILY HISTORY:  Family history of CHF (congestive heart failure) (Mother)    Family history of emphysema (Father)        SOCIAL HISTORY: denies tobacco / ETOH / illicit drug use     CIGARETTES:  ALCOHOL:  MARIJUANA:  ILLICIT DRUGS:        PREVIOUS DIAGNOSTIC TESTING:      ECHO  FINDINGS:  < from: TTE Echo Complete w/o Contrast w/ Doppler (23 @ 08:17) >  Summary:   1. Endocardial visualization was enhanced with intravenous echo contrast.   2. Left ventricular ejection fraction, by visual estimation, is 60 to   65%.   3. Normal global left ventricular systolic function.   4. Spectral Doppler shows impaired relaxation pattern of left   ventricular myocardial filling (Grade I diastolic dysfunction).   5. Mild aortic regurgitation.   6. Mild tricuspid regurgitation.    < end of copied text >    STRESS  FINDINGS:    CATHETERIZATION  FINDINGS:    ELECTROPHYSIOLOGY STUDY  FINDINGS:    CAROTID ULTRASOUND:  FINDINGS    VENOUS DUPLEX SCAN:  FINDINGS:    CHEST CT PULMONARY ANGIO with IV Contrast:  FINDINGS:      MEDICATIONS  (STANDING):  aspirin  chewable 81 milliGRAM(s) Oral daily  atorvastatin 80 milliGRAM(s) Oral at bedtime  clopidogrel Tablet 75 milliGRAM(s) Oral daily  enoxaparin Injectable 40 milliGRAM(s) SubCutaneous every 24 hours  multivitamin 1 Tablet(s) Oral daily  pantoprazole    Tablet 40 milliGRAM(s) Oral before breakfast    MEDICATIONS  (PRN):      Vital Signs Last 24 Hrs  T(C): 36.7 (2023 16:06), Max: 36.7 (2023 16:06)  T(F): 98 (2023 16:06), Max: 98 (2023 16:06)  HR: 72 (2023 16:06) (53 - 72)  BP: 131/81 (2023 16:06) (131/81 - 182/85)  BP(mean): 97 (2023 05:30) (97 - 129)  RR: 18 (2023 16:06) (18 - 18)  SpO2: 95% (2023 16:06) (94% - 100%)    Parameters below as of 2023 16:06  Patient On (Oxygen Delivery Method): room air        PHYSICAL EXAM:    GENERAL: In no apparent distress, well nourished, and hydrated.  HEAD:  Atraumatic, Normocephalic  EYES: EOMI, PERRLA, conjunctiva and sclera clear  NECK: Supple and normal thyroid.  No JVD or carotid bruit.  Carotid pulse is 2+ bilaterally.  HEART: Regular rate and rhythm; No murmurs, rubs, or gallops.  PULMONARY: Clear to auscultation and perfusion.  No rales, wheezing, or rhonchi bilaterally.  ABDOMEN: Soft, Nontender, Nondistended; Bowel sounds present  EXTREMITIES:  2+ Peripheral Pulses, No clubbing, cyanosis, or edema  NEUROLOGICAL: Grossly nonfocal    I&O's Detail    Daily     Daily     INTERPRETATION OF TELEMETRY:    EC Lead ECG:   Ventricular Rate 53 BPM    Atrial Rate 53 BPM    P-R Interval 178 ms    QRS Duration 82 ms    Q-T Interval 444 ms    QTC Calculation(Bazett) 416 ms    P Axis 23 degrees    R Axis 27 degrees    T Axis 14 degrees    Diagnosis Line Sinus bradycardia  Possible Inferior infarct , age undetermined  Abnormal ECG    Confirmed by Juan Simons ([id number]) on 2023 4:15:55 PM (23 @ 15:16)        LABS:                        13.9   6.75  )-----------( 181      ( 2023 05:55 )             38.8         145  |  107  |  16  ----------------------------<  87  4.2   |  25  |  1.0    Ca    8.5      2023 05:55    TPro  7.0  /  Alb  4.5  /  TBili  0.4  /  DBili  x   /  AST  21  /  ALT  19  /  AlkPhos  88      CARDIAC MARKERS ( 2023 14:22 )  x     / <0.01 ng/mL / x     / x     / x          PT/INR - ( 2023 14:22 )   PT: 11.00 sec;   INR: 0.97 ratio         PTT - ( 2023 14:22 )  PTT:26.6 sec  Urinalysis Basic - ( 2023 05:55 )    Color: x / Appearance: x / SG: x / pH: x  Gluc: 87 mg/dL / Ketone: x  / Bili: x / Urobili: x   Blood: x / Protein: x / Nitrite: x   Leuk Esterase: x / RBC: x / WBC x   Sq Epi: x / Non Sq Epi: x / Bacteria: x      BNP  Thyroid Stimulating Hormone, Serum: 6.94 uIU/mL [0.27 - 4.20] (23 @ 05:55)      COVID-19 PCR: NotDetec (23 @ 09:39)        RADIOLOGY & ADDITIONAL STUDIES:    < from: MR Head No Cont (23 @ 20:56) >  IMPRESSION:    Multiple acute infarcts involving the left thalamus, occipital and   temporal lobes consistent with acute embolic ischemic infarction. No   evidence of hemorrhage.    < end of copied text >

## 2023-06-26 NOTE — CONSULT NOTE ADULT - ASSESSMENT
IMPRESSION: Rehab of multiple acute L strokes / HLD, GERD, TIA    PRECAUTIONS: [   ] Cardiac  [   ] Respiratory  [   ] Seizures [   ] Contact Isolation  [   ] Droplet Isolation  [   ] Other    Weight Bearing Status:     RECOMMENDATION:    Out of Bed to Chair     DVT/Decubiti Prophylaxis    REHAB PLAN:     [ x   ] Bedside P/T 3-5 times a week   [  x  ]   Bedside O/T  2-3 times a week             [  x  ] Speech Therapy               [    ]  No Rehab Therapy Indicated   Conditioning/ROM                                    ADL  Bed Mobility                                               Conditioning/ROM  Transfers                                                     Bed Mobility  Sitting /Standing Balance                         Transfers                                        Gait Training                                               Sitting/Standing Balance  Stair Training [   ]Applicable                    Home equipment Eval                                                                        Splinting  [   ] Only      GOALS:   ADL   [x    ]   Independent                    Transfers  [  x  ] Independent                          Ambulation  [  x  ] Independent     [ x  ] With device                            [    ]  CG                                                         [    ]  CG                                                                  [    ] CG                            [    ] Min A                                                   [    ] Min A                                                              [    ] Min  A          DISCHARGE PLAN:   [    ]  Good candidate for Intensive Rehabilitation/Hospital based                                             Will tolerate 3hrs Intensive Rehab Daily                                       [     ]  Short Term Rehab in Skilled Nursing Facility                                       [  x   ]  Home with Outpatient or  services                                         [     ]  Possible Candidate for Intensive Hospital based Rehab

## 2023-06-26 NOTE — OCCUPATIONAL THERAPY INITIAL EVALUATION ADULT - LIVES WITH, PROFILE
pt has home in [place] with no KOBE, however will be traveling to [place] home on d/c, condo, +2 levels of stairs, +bathtub/spouse

## 2023-06-26 NOTE — CONSULT NOTE ADULT - ASSESSMENT
73y Male, physically active, plays tennis and bikes, with h/o HLD, GERD, ?TIA 2020 presented to face numbness. Was found acute left thalamus infarct on CT head. Subsequent MRI revealed multiple infarcts.   EP consulted for long term cardiac monitoring    Risks and benefits of ILR placement discusses with patient and wife at bedside, All in agreement  Pt lives in [place]   will transfer pt ILR once patient has Electrophysiologists set up in [place]  plan ILR placement today  do not remove bandage for 5 days  do not get site wet for 5 days  f/u with Dr Oro as out pt

## 2023-06-26 NOTE — PROGRESS NOTE ADULT - ASSESSMENT
72YO male with PMHx of HLD, GERD, TIA 2020 as double vision presented with R sided perioral numbness for last 4 days and R thumb numbness starting 1 day prior to admission.   MRI resulted multiple scattered L hemispheric ischemic strokes, likely cardioembolic, patient is admitted for further work up and management.     Neuro  Multiple scattered acute ischemic stroke of L hemispheric, L thalamus, L occipital, L temporooccipital not on AP or AC before admission, likely cardioembolic source.   - was loaded with dapt in ED.   - continue aspirin 81mg and plavix 75mg daily  - continue atorvastatin 80mg daily  - q4hr stroke neuro checks and vitals  - MRI Brain without contrast completed.   - Stroke Code HCT Results: as above  - Stroke Code CTA Results: as above  - Stroke education    Cards  #HTN  - SBP Goal -160  - hold home blood pressure medication for now  - TTE Pending.   - EP consulted for ILR.     #HLD  - high intensity statin  - LDL results: 169    Pulm  - Call provider if SPO2 < 94%    GI  #Nutrition/Fluids/Electrolytes   - replete K<4 and Mg <2  - Diet: DASH, Regular, GERD.   - IVF: N/A    Endocrine  #DM  - A1C results: Pending  - ISS  - TSH results: Pending    DVT Prophylaxis  - Lovenox sq for DVT prophylaxis   - SCDs for DVT prophylaxis     Dispo: stroke unit, possible home with PT, pending clinical course.

## 2023-06-26 NOTE — OCCUPATIONAL THERAPY INITIAL EVALUATION ADULT - GENERAL OBSERVATIONS, REHAB EVAL
Pt received semifowler in bed in NAD, +tele, +BP cuff, +pulse oxi, agreeable to OT evaluation, left semi richards in bed in NAD,

## 2023-06-26 NOTE — OCCUPATIONAL THERAPY INITIAL EVALUATION ADULT - SPECIFY REASON(S)
Attempted to see pt for OT evaluation, pt off unit at CT scan at this time. Will follow up when pt available

## 2023-06-26 NOTE — SWALLOW BEDSIDE ASSESSMENT ADULT - SLP PERTINENT HISTORY OF CURRENT PROBLEM
Pt is a 72 y/o M w/ PMHx: HLD, GERD, TIA in 2020, p/w R-sided perioral numbness for the last 4 days and R-thumb numbness starting 1 day PTA. MRI showed multiple scattered acute ischemic stroke of L hemisphere, L thalamus, L occipital, L temporooccipital regions, likely cardioembolic source.

## 2023-06-26 NOTE — PHYSICAL THERAPY INITIAL EVALUATION ADULT - PERTINENT HX OF CURRENT PROBLEM, REHAB EVAL
74YO male with PMHx of HLD, GERD, TIA 2020 as double vision presented with R sided perioral numbness for last 4 days and R thumb numbness starting 1 day prior to admission.   MRI resulted multiple scattered L hemispheric ischemic strokes, likely cardioembolic, patient is admitted for further work up and management.   Multiple scattered acute ischemic stroke of L hemispheric, L thalamus, L occipital, L temporooccipital not on AP or AC before admission, likely cardioembolic source.

## 2023-06-26 NOTE — PHYSICAL THERAPY INITIAL EVALUATION ADULT - GAIT TRAINING, PT EVAL
150ft without AD Independent by d/c                            Stairs: 10 steps 1HR supervision by d/c

## 2023-06-26 NOTE — PROGRESS NOTE ADULT - SUBJECTIVE AND OBJECTIVE BOX
Neurology Progress Note    Interval History:    Patient was seen and examined, no acute event over night.     Medications:  aspirin  chewable 81 milliGRAM(s) Oral daily  atorvastatin 80 milliGRAM(s) Oral at bedtime  clopidogrel Tablet 75 milliGRAM(s) Oral daily  enoxaparin Injectable 40 milliGRAM(s) SubCutaneous every 24 hours  multivitamin 1 Tablet(s) Oral daily  pantoprazole    Tablet 40 milliGRAM(s) Oral before breakfast      Vital Signs Last 24 Hrs  T(C): 37.2 (25 Jun 2023 15:46), Max: 37.2 (25 Jun 2023 15:46)  T(F): 99 (25 Jun 2023 15:46), Max: 99 (25 Jun 2023 15:46)  HR: 53 (26 Jun 2023 05:30) (53 - 66)  BP: 138/79 (26 Jun 2023 05:30) (138/79 - 188/91)  BP(mean): 97 (26 Jun 2023 05:30) (97 - 129)  RR: 18 (26 Jun 2023 05:30) (18 - 18)  SpO2: 95% (26 Jun 2023 05:30) (94% - 100%)    Parameters below as of 26 Jun 2023 05:30  Patient On (Oxygen Delivery Method): room air    General: Well-developed, well nourished, in no acute distress.  Eyes: Conjunctiva and sclera clear.  Neurologic:  - Mental Status:  Alert, awake, oriented to person, place, and time; Speech is fluent with intact naming, repetition, and comprehension; Good overall fund of knowledge;   - Cranial Nerves II-XII:    II:  Visual fields are full to confronation; Pupils are equal, round, and reactive to light;  III, IV, VI:  Extraocular movements are intact without nystagmus.  V:  decreased right sided facial sensation in v2,v3 perioral area  VII:  Face is symmetric with normal eye closure and smile  VIII:  Hearing is intact to finger rub.  IX, X:  Uvula is midline and soft palate rises symmetrically  XI:  Head turning and shoulder shrug are intact.  XII:  Tongue protrudes in the midline.  - Motor:  Strength is 5/5 throughout.  There is no prontator drift.  Normal muscle bulk and tone throughout.  - Reflexes:  2+ and symmetric at the biceps, triceps, brachioradialis, knees, and ankles.  Plantar responses flexor.  - Sensory:  Intact throughout to vibration, mild numbness in right thumb  - Coordination:  Finger-nose-finger  without dysmetria.  Rapid alternating hand movements intact.  - Gait: Narrow based  NIHSS 1      Labs:  CBC Full  -  ( 26 Jun 2023 05:55 )  WBC Count : 6.75 K/uL  RBC Count : 4.24 M/uL  Hemoglobin : 13.9 g/dL  Hematocrit : 38.8 %  Platelet Count - Automated : 181 K/uL  Mean Cell Volume : 91.5 fL  Mean Cell Hemoglobin : 32.8 pg  Mean Cell Hemoglobin Concentration : 35.8 g/dL  Auto Neutrophil # : 3.96 K/uL  Auto Lymphocyte # : 1.74 K/uL  Auto Monocyte # : 0.67 K/uL  Auto Eosinophil # : 0.31 K/uL  Auto Basophil # : 0.05 K/uL  Auto Neutrophil % : 58.7 %  Auto Lymphocyte % : 25.8 %  Auto Monocyte % : 9.9 %  Auto Eosinophil % : 4.6 %  Auto Basophil % : 0.7 %    06-26    145  |  107  |  16  ----------------------------<  87  4.2   |  25  |  1.0    Ca    8.5      26 Jun 2023 05:55    TPro  7.0  /  Alb  4.5  /  TBili  0.4  /  DBili  x   /  AST  21  /  ALT  19  /  AlkPhos  88  06-25    LIVER FUNCTIONS - ( 25 Jun 2023 14:22 )  Alb: 4.5 g/dL / Pro: 7.0 g/dL / ALK PHOS: 88 U/L / ALT: 19 U/L / AST: 21 U/L / GGT: x           PT/INR - ( 25 Jun 2023 14:22 )   PT: 11.00 sec;   INR: 0.97 ratio         PTT - ( 25 Jun 2023 14:22 )  PTT:26.6 sec  Urinalysis Basic - ( 26 Jun 2023 05:55 )    Color: x / Appearance: x / SG: x / pH: x  Gluc: 87 mg/dL / Ketone: x  / Bili: x / Urobili: x   Blood: x / Protein: x / Nitrite: x   Leuk Esterase: x / RBC: x / WBC x   Sq Epi: x / Non Sq Epi: x / Bacteria: x        RADIOLOGY & ADDITIONAL TESTS:  < from: MR Head No Cont (06.25.23 @ 20:56) >    IMPRESSION:    Punctate regions of restricted diffusion within the LEFT thalamus,   posterior occipital and occipitotemporal lobes, consistent with acute   embolic ischemic infarction. No evidence of intraparenchymal hemorrhage.        ******PRELIMINARY REPORT******      ******PRELIMINARY REPORT******     < end of copied text >

## 2023-06-26 NOTE — PHYSICAL THERAPY INITIAL EVALUATION ADULT - LEVEL OF CONSCIOUSNESS, REHAB EVAL
[facility]  Neurology  [address] [place] [address] [address] [place] [address]  Phone: [phone]  Fax:   Follow Up Time: alert

## 2023-06-26 NOTE — PATIENT PROFILE ADULT - FALL HARM RISK - RISK INTERVENTIONS

## 2023-06-26 NOTE — PATIENT PROFILE ADULT - FUNCTIONAL ASSESSMENT - BASIC MOBILITY 6.
2-calculated by average/Not able to assess (calculate score using [facility] averaging method)

## 2023-06-26 NOTE — SWALLOW BEDSIDE ASSESSMENT ADULT - SWALLOW EVAL: ORAL MUSCULATURE
pt reporting R-sided facial numbness/generally intact decreased R-sided facial sensation/generally intact

## 2023-06-26 NOTE — OCCUPATIONAL THERAPY INITIAL EVALUATION ADULT - PERTINENT HX OF CURRENT PROBLEM, REHAB EVAL
72yo male with pmhx of HLD, GERD and ?TIA in 2020 while in [place]("double vision") presenting with right sided perioral numbness for last 4 days. NIHSS 1 on arrival. Found to have an acute infarct in left thalamic area on CTH, most likely cardioembolic vs small vessel disease. Currenlty admitted under neurology for further work up.

## 2023-06-26 NOTE — PROGRESS NOTE ADULT - ATTENDING COMMENTS
Fax received from [facility] that pt has been having some increase in disorientation. Written order per Dr Morejon to increase aricept to 10mg.  Faxed to [facility] and sent to pharmacy.  
please refer to attending attestation on H&P

## 2023-06-26 NOTE — OCCUPATIONAL THERAPY INITIAL EVALUATION ADULT - HOME MANAGEMENT SKILLS, PREVIOUS LEVEL OF FUNCTION, OT EVAL
independent Unna Boot Text: An Unna boot was placed to help immobilize the limb and facilitate more rapid healing.

## 2023-06-26 NOTE — PHYSICAL THERAPY INITIAL EVALUATION ADULT - ASSISTIVE DEVICE FOR TRANSFER: GAIT, REHAB EVAL
Assessment and Plan:    Problem List Items Addressed This Visit        Digestive    GERD (gastroesophageal reflux disease)                 Diagnoses and all orders for this visit:    Gastroesophageal reflux disease, esophagitis presence not specified  -     pantoprazole (PROTONIX) 40 mg tablet; Take 1 tablet (40 mg total) by mouth daily before breakfast              Subjective:      Patient ID: Venice Merida is a 79 y o  female  CC:    Chief Complaint   Patient presents with    Follow-up     Patient present today for her routine follow up  HPI:    F/u BP, dooing much better on 40 mg dose, no side effects, feels well      The following portions of the patient's history were reviewed and updated as appropriate: allergies, current medications, past family history, past medical history, past social history, past surgical history and problem list       Review of Systems   Constitutional: Negative for activity change, fatigue and unexpected weight change  Respiratory: Positive for cough and shortness of breath  Annoying dry cough   Cardiovascular: Negative for chest pain  Gastrointestinal: Negative for abdominal pain  Some heartburn   Neurological: Negative for dizziness and headaches  Data to review:       Objective:    Vitals:    03/16/20 0751   BP: 142/78   BP Location: Left arm   Patient Position: Sitting   Cuff Size: Standard   Pulse: 84   Resp: 22   Temp: 98 4 °F (36 9 °C)   TempSrc: Temporal   Weight: 77 1 kg (170 lb)   Height: 5' 7" (1 702 m)        Physical Exam   Constitutional: She appears well-developed and well-nourished  Neck: No thyromegaly present  Cardiovascular: Normal rate, regular rhythm and normal heart sounds  Pulmonary/Chest: Effort normal and breath sounds normal    Musculoskeletal: She exhibits no edema  Lymphadenopathy:     She has no cervical adenopathy  Vitals reviewed  no AD

## 2023-06-26 NOTE — OCCUPATIONAL THERAPY INITIAL EVALUATION ADULT - PATIENT PROFILE REVIEW, REHAB EVAL
Evaluation Time: 08:40am; pt chart thoroughly reviewed prior to OT evaluation/yes
Evaluation Time: 13:35-14:15; pt chart thoroughly reviewed prior to OT evaluation/yes

## 2023-06-26 NOTE — CONSULT NOTE ADULT - SUBJECTIVE AND OBJECTIVE BOX
HPI:  74yo male PMHx HLD, GERD and ?TIA in 2020 while in [place]("double vision") presenting with right sided perioral numbness for last 4 days. Patient reports undergoing root canal w/ possible dental implant placed. After the procedure patient reports feeling some numbness in his right lower lips which evolved over the last days to the upper lip and right side lower jaw area. This morning, patient reports feeling some numbness in his right thumb area. Denies any other symptoms. No weakness of the face, no head or facial pain, no difficulty speaking, no focal weakness, no difficulty ambulating or falls. No nausea or vomiting. Reports feeling fine otherwise.  In ED, vitals: /91 HR 66 RR 18 T 98  CTH: acute left thalamus infarct  CTA: no LVO or significant stenosis - prelim read    Patient admitted under neurology for stroke work up.      < from: MR Head No Cont (06.25.23 @ 20:56) >  ACC: [id number] EXAM:  MR BRAIN   ORDERED BY: BURTON COOK     PROCEDURE DATE:  06/25/2023          INTERPRETATION:  CLINICAL INDICATION: Stroke. Right facial numbness.    TECHNIQUE: Multi-planar multi-sequential MR imaging of the brain was   performed without intravenous contrast.    COMPARISON: CT head dated 6/25/2023.    FINDINGS:    There are focal regions of restricted diffusion within the left thalamus,   occipital and posterior temporal lobes in a PCA distribution. There is no   foci of susceptibility weighted artifact in these regions.    There are few periventricular and subcortical white matter T2/FLAIR   signal hyperintensities, consistent with minimal chronic microvascular   ischemic changes.    Ventricles and sulci are unremarkable. There is no hydrocephalus.    There are no extra-axial fluid collections.    The skull base flow voids are present.    Bilateral intraorbital contents are unremarkable. Visualized paranasal   sinuses and mastoid air cells are clear. Soft tissuesand calvarium are   unremarkable. There has been bilateral cataract surgery.    IMPRESSION:    Multiple acute infarcts involving the left thalamus, occipital and   temporal lobes consistent with acute embolic ischemic infarction. No   evidence of hemorrhage.    --- End of Report ---    < end of copied text >    Medical charts / labs / imaging studies / PT notes reviewed     PAST MEDICAL & SURGICAL HISTORY:  Hyperlipidemia      H/O hernia repair      S/P cataract surgery          Hospital Course:    TODAY'S SUBJECTIVE & REVIEW OF SYMPTOMS:     Constitutional WNL   Cardio WNL   Resp WNL   GI WNL  Heme WNL  Endo WNL  Skin WNL  MSK WNL  Neuro right facial numbness   Cognitive WNL  Psych WNL      MEDICATIONS  (STANDING):  aspirin  chewable 81 milliGRAM(s) Oral daily  atorvastatin 80 milliGRAM(s) Oral at bedtime  clopidogrel Tablet 75 milliGRAM(s) Oral daily  enoxaparin Injectable 40 milliGRAM(s) SubCutaneous every 24 hours  multivitamin 1 Tablet(s) Oral daily  pantoprazole    Tablet 40 milliGRAM(s) Oral before breakfast    MEDICATIONS  (PRN):      FAMILY HISTORY:  Family history of CHF (congestive heart failure) (Mother)    Family history of emphysema (Father)        Allergies    apple (Hives; Urticaria)  Cherries (Anaphylaxis; Rash; Urticaria; Hives)  Nuts (Unknown)  No Known Drug Allergies    Intolerances        SOCIAL HISTORY:    [  ] Etoh  [  ] Smoking  [  ] Substance abuse     Home Environment:  [   ] Home Alone  [ x  ] Lives with Family  [   ] Home Health Aid    Dwelling:  [   ] Apartment  [ x  ] Private House  [   ] Adult Home  [   ] Skilled Nursing Facility      [   ] Short Term  [   ] Long Term  [ x  ] Stairs       Elevator [   ]    FUNCTIONAL STATUS PTA: (Check all that apply)  Ambulation: [  x  ]Independent    [   ] Dependent     [   ] Non-Ambulatory  Assistive Device: [   ] SA Cane  [   ]  Q Cane  [   ] Walker  [   ]  Wheelchair  ADL : [ x  ] Independent  [    ]  Dependent       Vital Signs Last 24 Hrs  T(C): 37.2 (25 Jun 2023 15:46), Max: 37.2 (25 Jun 2023 15:46)  T(F): 99 (25 Jun 2023 15:46), Max: 99 (25 Jun 2023 15:46)  HR: 53 (26 Jun 2023 05:30) (53 - 60)  BP: 138/79 (26 Jun 2023 05:30) (138/79 - 182/85)  BP(mean): 97 (26 Jun 2023 05:30) (97 - 129)  RR: 18 (26 Jun 2023 05:30) (18 - 18)  SpO2: 95% (26 Jun 2023 05:30) (94% - 100%)    Parameters below as of 26 Jun 2023 05:30  Patient On (Oxygen Delivery Method): room air          PHYSICAL EXAM: Awake & Alert  GENERAL: NAD  HEAD:  Normocephalic  CHEST/LUNG: Clear   HEART: S1S2+  ABDOMEN: Soft, Nontender  EXTREMITIES:  no calf tenderness    NERVOUS SYSTEM:  Cranial Nerves 2-12 intact [   ] Abnormal  [ x  ]decreased right lower face light touch sensation   ROM: WFL all extremities [x   ]  Abnormal [   ]  Motor Strength: WFL all extremities  [  x ]  Abnormal [   ]  Sensation: intact to light touch [   ] Abnormal [   ]    FUNCTIONAL STATUS:  Bed Mobility: Independent [   ]  Supervision [  x ]  Needs Assistance [   ]  N/A [   ]  Transfers: Independent [   ]  Supervision [  x ]  Needs Assistance [   ]  N/A [   ]   Ambulation: Independent [   ]  Supervision [  x ]  Needs Assistance [   ]  N/A [   ]  ADL: Independent [   ] Requires Assistance [   ] N/A [   ]      LABS:                        13.9   6.75  )-----------( 181      ( 26 Jun 2023 05:55 )             38.8     06-26    145  |  107  |  16  ----------------------------<  87  4.2   |  25  |  1.0    Ca    8.5      26 Jun 2023 05:55    TPro  7.0  /  Alb  4.5  /  TBili  0.4  /  DBili  x   /  AST  21  /  ALT  19  /  AlkPhos  88  06-25    PT/INR - ( 25 Jun 2023 14:22 )   PT: 11.00 sec;   INR: 0.97 ratio         PTT - ( 25 Jun 2023 14:22 )  PTT:26.6 sec  Urinalysis Basic - ( 26 Jun 2023 05:55 )    Color: x / Appearance: x / SG: x / pH: x  Gluc: 87 mg/dL / Ketone: x  / Bili: x / Urobili: x   Blood: x / Protein: x / Nitrite: x   Leuk Esterase: x / RBC: x / WBC x   Sq Epi: x / Non Sq Epi: x / Bacteria: x        RADIOLOGY & ADDITIONAL STUDIES:

## 2023-06-26 NOTE — PATIENT PROFILE ADULT - STATED REASON FOR ADMISSION
Pt c/o right side of face numbness since thursday night. NIH 0 in triage. HX of NIH 3 years ago  numbness

## 2023-06-27 ENCOUNTER — TRANSCRIPTION ENCOUNTER (OUTPATIENT)
Age: 73
End: 2023-06-27

## 2023-06-27 VITALS
TEMPERATURE: 96 F | SYSTOLIC BLOOD PRESSURE: 158 MMHG | RESPIRATION RATE: 20 BRPM | HEART RATE: 68 BPM | DIASTOLIC BLOOD PRESSURE: 91 MMHG

## 2023-06-27 DIAGNOSIS — B00.2 HERPESVIRAL GINGIVOSTOMATITIS AND PHARYNGOTONSILLITIS: ICD-10-CM

## 2023-06-27 PROBLEM — E78.5 HYPERLIPIDEMIA, UNSPECIFIED: Chronic | Status: ACTIVE | Noted: 2023-06-25

## 2023-06-27 LAB
ANION GAP SERPL CALC-SCNC: 10 MMOL/L — SIGNIFICANT CHANGE UP (ref 7–14)
BLD GP AB SCN SERPL QL: SIGNIFICANT CHANGE UP
BUN SERPL-MCNC: 22 MG/DL — HIGH (ref 10–20)
CALCIUM SERPL-MCNC: 8.6 MG/DL — SIGNIFICANT CHANGE UP (ref 8.4–10.5)
CHLORIDE SERPL-SCNC: 106 MMOL/L — SIGNIFICANT CHANGE UP (ref 98–110)
CO2 SERPL-SCNC: 26 MMOL/L — SIGNIFICANT CHANGE UP (ref 17–32)
CREAT SERPL-MCNC: 1.1 MG/DL — SIGNIFICANT CHANGE UP (ref 0.7–1.5)
EGFR: 70 ML/MIN/1.73M2 — SIGNIFICANT CHANGE UP
GLUCOSE SERPL-MCNC: 106 MG/DL — HIGH (ref 70–99)
HCT VFR BLD CALC: 40.2 % — LOW (ref 42–52)
HGB BLD-MCNC: 14.2 G/DL — SIGNIFICANT CHANGE UP (ref 14–18)
MAGNESIUM SERPL-MCNC: 1.9 MG/DL — SIGNIFICANT CHANGE UP (ref 1.8–2.4)
MCHC RBC-ENTMCNC: 32.4 PG — HIGH (ref 27–31)
MCHC RBC-ENTMCNC: 35.3 G/DL — SIGNIFICANT CHANGE UP (ref 32–37)
MCV RBC AUTO: 91.8 FL — SIGNIFICANT CHANGE UP (ref 80–94)
NRBC # BLD: 0 /100 WBCS — SIGNIFICANT CHANGE UP (ref 0–0)
PHOSPHATE SERPL-MCNC: 4.6 MG/DL — SIGNIFICANT CHANGE UP (ref 2.1–4.9)
PLATELET # BLD AUTO: 186 K/UL — SIGNIFICANT CHANGE UP (ref 130–400)
PMV BLD: 10.8 FL — HIGH (ref 7.4–10.4)
POTASSIUM SERPL-MCNC: 3.7 MMOL/L — SIGNIFICANT CHANGE UP (ref 3.5–5)
POTASSIUM SERPL-SCNC: 3.7 MMOL/L — SIGNIFICANT CHANGE UP (ref 3.5–5)
RBC # BLD: 4.38 M/UL — LOW (ref 4.7–6.1)
RBC # FLD: 12.5 % — SIGNIFICANT CHANGE UP (ref 11.5–14.5)
SODIUM SERPL-SCNC: 142 MMOL/L — SIGNIFICANT CHANGE UP (ref 135–146)
WBC # BLD: 7.11 K/UL — SIGNIFICANT CHANGE UP (ref 4.8–10.8)
WBC # FLD AUTO: 7.11 K/UL — SIGNIFICANT CHANGE UP (ref 4.8–10.8)

## 2023-06-27 PROCEDURE — 99222 1ST HOSP IP/OBS MODERATE 55: CPT

## 2023-06-27 PROCEDURE — 99238 HOSP IP/OBS DSCHRG MGMT 30/<: CPT

## 2023-06-27 RX ORDER — PANTOPRAZOLE SODIUM 20 MG/1
1 TABLET, DELAYED RELEASE ORAL
Qty: 30 | Refills: 2
Start: 2023-06-27 | End: 2023-09-24

## 2023-06-27 RX ORDER — CLOPIDOGREL BISULFATE 75 MG/1
1 TABLET, FILM COATED ORAL
Qty: 90 | Refills: 0
Start: 2023-06-27 | End: 2023-09-24

## 2023-06-27 RX ORDER — ASPIRIN/CALCIUM CARB/MAGNESIUM 324 MG
1 TABLET ORAL
Qty: 30 | Refills: 3
Start: 2023-06-27 | End: 2023-10-24

## 2023-06-27 RX ORDER — ACYCLOVIR 50 MG/G
1 OINTMENT TOPICAL
Qty: 1 | Refills: 0
Start: 2023-06-27 | End: 2023-07-03

## 2023-06-27 RX ORDER — OMEPRAZOLE 10 MG/1
1 CAPSULE, DELAYED RELEASE ORAL
Refills: 0 | DISCHARGE

## 2023-06-27 RX ORDER — VALACYCLOVIR 500 MG/1
2 TABLET, FILM COATED ORAL
Qty: 4 | Refills: 0
Start: 2023-06-27 | End: 2023-06-27

## 2023-06-27 RX ORDER — ACYCLOVIR 50 MG/G
1 OINTMENT TOPICAL
Refills: 0 | Status: DISCONTINUED | OUTPATIENT
Start: 2023-06-27 | End: 2023-06-27

## 2023-06-27 RX ORDER — VALACYCLOVIR 500 MG/1
2000 TABLET, FILM COATED ORAL
Refills: 0 | Status: DISCONTINUED | OUTPATIENT
Start: 2023-06-27 | End: 2023-06-27

## 2023-06-27 RX ORDER — ATORVASTATIN CALCIUM 80 MG/1
1 TABLET, FILM COATED ORAL
Qty: 30 | Refills: 3
Start: 2023-06-27 | End: 2023-10-24

## 2023-06-27 RX ADMIN — ACYCLOVIR 1 APPLICATION(S): 50 OINTMENT TOPICAL at 12:40

## 2023-06-27 RX ADMIN — CLOPIDOGREL BISULFATE 75 MILLIGRAM(S): 75 TABLET, FILM COATED ORAL at 11:03

## 2023-06-27 RX ADMIN — Medication 81 MILLIGRAM(S): at 11:03

## 2023-06-27 RX ADMIN — PANTOPRAZOLE SODIUM 40 MILLIGRAM(S): 20 TABLET, DELAYED RELEASE ORAL at 05:44

## 2023-06-27 RX ADMIN — VALACYCLOVIR 2000 MILLIGRAM(S): 500 TABLET, FILM COATED ORAL at 12:36

## 2023-06-27 RX ADMIN — ENOXAPARIN SODIUM 40 MILLIGRAM(S): 100 INJECTION SUBCUTANEOUS at 05:44

## 2023-06-27 RX ADMIN — Medication 1 TABLET(S): at 11:03

## 2023-06-27 NOTE — DISCHARGE NOTE PROVIDER - NSDCCPCAREPLAN_GEN_ALL_CORE_FT
PRINCIPAL DISCHARGE DIAGNOSIS  Diagnosis: Acute ischemic left PCA stroke  Assessment and Plan of Treatment: During this hospital admission, you had an ischemic stroke. During an ischemic stroke, blood stops flowing to part of your brain because of a blockage in the blood vessel. This can damage areas in the brain that control other parts of the body.  Please take your aspirin and plavix for blood thinning for 90 days and Atorvastatin for cholesterol medication/blood vessel protection as prescribed to prevent further strokes. Do not skip doses and do not run low on your medication. If you run low on your medication, please contact your doctor.  You will follow up outpatient with the stroke Nurse Practitioner as scheduled below.  Doing your regular tasks may be difficult after you've had a stroke, but you can learn new ways to manage your daily activities. In fact, doing daily activities may help you to regain muscle strength. Be patient, give yourself time to adjust, and appreciate the progress you make. For example, when showering or bathing, test the water temperature with a hand or foot that was not affected by the stroke, use grab bars, a shower seat, a hand-held showerhead, etc. It is normal to feel fatigue after a stroke, while some days may be worse than others, you will continue to improve.  Call 911 right away if you have any of the following symptoms of another stroke:  B: Balance: Sudden: Dizziness, loss of balance, or a sense of falling, difficulty with coordinating movement  E: Eyes: Sudden double vision or trouble seeing in one or both eyes  F: Face: Sudden uneven face  A: Arms (Legs): Sudden weakness, tingling, or loss of feeling on one side of your face or body  S: Speech: Sudden trouble talking or slurred speech, sudden difficulty understanding others  T: Time: Please call 911 right away and go to the emergency room  •Sudden, severe headache  •Blackouts or seizures      SECONDARY DISCHARGE DIAGNOSES  Diagnosis: HLD (hyperlipidemia)  Assessment and Plan of Treatment:     Diagnosis: HTN (hypertension)  Assessment and Plan of Treatment:     Diagnosis: GERD (gastroesophageal reflux disease)  Assessment and Plan of Treatment:     Diagnosis: History of TIAs  Assessment and Plan of Treatment:      PRINCIPAL DISCHARGE DIAGNOSIS  Diagnosis: Acute ischemic left PCA stroke  Assessment and Plan of Treatment: During this hospital admission, you had an ischemic stroke. During an ischemic stroke, blood stops flowing to part of your brain because of a blockage in the blood vessel. This can damage areas in the brain that control other parts of the body.  Please take your aspirin and plavix for blood thinning for 90 days and Atorvastatin for cholesterol medication/blood vessel protection as prescribed to prevent further strokes. Do not skip doses and do not run low on your medication. If you run low on your medication, please contact your doctor.  You will follow up outpatient with the stroke Nurse Practitioner as scheduled below.  Doing your regular tasks may be difficult after you've had a stroke, but you can learn new ways to manage your daily activities. In fact, doing daily activities may help you to regain muscle strength. Be patient, give yourself time to adjust, and appreciate the progress you make. For example, when showering or bathing, test the water temperature with a hand or foot that was not affected by the stroke, use grab bars, a shower seat, a hand-held showerhead, etc. It is normal to feel fatigue after a stroke, while some days may be worse than others, you will continue to improve.  Call 911 right away if you have any of the following symptoms of another stroke:  B: Balance: Sudden: Dizziness, loss of balance, or a sense of falling, difficulty with coordinating movement  E: Eyes: Sudden double vision or trouble seeing in one or both eyes  F: Face: Sudden uneven face  A: Arms (Legs): Sudden weakness, tingling, or loss of feeling on one side of your face or body  S: Speech: Sudden trouble talking or slurred speech, sudden difficulty understanding others  T: Time: Please call 911 right away and go to the emergency room  •Sudden, severe headache  •Blackouts or seizures  Follow up with cardiologist in 2 - 4 weeks.   Follow up with Neurologsit in 2 - 4 weeks.   Continue wth Aspirin and Plavix for 3 months then continue as Aspirin only.      SECONDARY DISCHARGE DIAGNOSES  Diagnosis: HLD (hyperlipidemia)  Assessment and Plan of Treatment:     Diagnosis: HTN (hypertension)  Assessment and Plan of Treatment:     Diagnosis: GERD (gastroesophageal reflux disease)  Assessment and Plan of Treatment:     Diagnosis: History of TIAs  Assessment and Plan of Treatment:      PRINCIPAL DISCHARGE DIAGNOSIS  Diagnosis: Acute ischemic left PCA stroke  Assessment and Plan of Treatment: During this hospital admission, you had an ischemic stroke. During an ischemic stroke, blood stops flowing to part of your brain because of a blockage in the blood vessel. This can damage areas in the brain that control other parts of the body.  Please take your aspirin and plavix for blood thinning for 90 days and Atorvastatin for cholesterol medication/blood vessel protection as prescribed to prevent further strokes. Do not skip doses and do not run low on your medication. If you run low on your medication, please contact your doctor.  You will follow up outpatient with the stroke Nurse Practitioner as scheduled below.  Doing your regular tasks may be difficult after you've had a stroke, but you can learn new ways to manage your daily activities. In fact, doing daily activities may help you to regain muscle strength. Be patient, give yourself time to adjust, and appreciate the progress you make. For example, when showering or bathing, test the water temperature with a hand or foot that was not affected by the stroke, use grab bars, a shower seat, a hand-held showerhead, etc. It is normal to feel fatigue after a stroke, while some days may be worse than others, you will continue to improve.  Call 911 right away if you have any of the following symptoms of another stroke:  B: Balance: Sudden: Dizziness, loss of balance, or a sense of falling, difficulty with coordinating movement  E: Eyes: Sudden double vision or trouble seeing in one or both eyes  F: Face: Sudden uneven face  A: Arms (Legs): Sudden weakness, tingling, or loss of feeling on one side of your face or body  S: Speech: Sudden trouble talking or slurred speech, sudden difficulty understanding others  T: Time: Please call 911 right away and go to the emergency room  •Sudden, severe headache  •Blackouts or seizures  Follow up with cardiologist in 2 - 4 weeks.   Follow up with Neurologsit in 2 - 4 weeks.   Continue wth Aspirin and Plavix for 3 months then continue as Aspirin only.      SECONDARY DISCHARGE DIAGNOSES  Diagnosis: HLD (hyperlipidemia)  Assessment and Plan of Treatment:     Diagnosis: HTN (hypertension)  Assessment and Plan of Treatment:     Diagnosis: GERD (gastroesophageal reflux disease)  Assessment and Plan of Treatment:     Diagnosis: History of TIAs  Assessment and Plan of Treatment:     Diagnosis: Hypothyroidism  Assessment and Plan of Treatment: The TSH resulted 6.9 which could be due to a possible hypothyroidism, pls follow up with your primary care physician in 2-4 wks after discharge

## 2023-06-27 NOTE — DISCHARGE NOTE PROVIDER - NSDCMRMEDTOKEN_GEN_ALL_CORE_FT
Multiple Vitamins oral tablet: 1 orally once a day  omeprazole 40 mg oral delayed release capsule: 1 orally once a day   aspirin 81 mg oral tablet, chewable: 1 tab(s) orally once a day  atorvastatin 80 mg oral tablet: 1 tab(s) orally once a day (at bedtime)  clopidogrel 75 mg oral tablet: 1 tab(s) orally once a day  Multiple Vitamins oral tablet: 1 tablet orally once a day  pantoprazole 40 mg oral delayed release tablet: 1 tab(s) orally once a day (in the morning)   acyclovir 5% topical ointment: Apply topically to affected area 4 times a day 1 Apply topically to affected area 4 times a day  aspirin 81 mg oral tablet, chewable: 1 tab(s) orally once a day  atorvastatin 80 mg oral tablet: 1 tab(s) orally once a day (at bedtime)  clopidogrel 75 mg oral tablet: 1 tab(s) orally once a day  Multiple Vitamins oral tablet: 1 tablet orally once a day  pantoprazole 40 mg oral delayed release tablet: 1 tab(s) orally once a day (in the morning)  valACYclovir 1 g oral tablet: 2 tab(s) orally 2 times a day

## 2023-06-27 NOTE — CONSULT NOTE ADULT - ASSESSMENT
2YO male with PMHx of HLD, GERD, TIA 2020 as double vision presented with R sided perioral numbness for last 4 days and R thumb numbness starting 1 day prior to admission.   MRI resulted multiple scattered L hemispheric ischemic strokes, likely cardioembolic, patient is admitted for further work up and management.     #Multiple scattered acute ischemic stroke of L hemispheric, L thalamus, L occipital, L temporooccipital not on AP or AC before admission, possibly cardioembolic source.   - was loaded with dapt in ED.   - continue aspirin 81mg and plavix 75mg daily  - continue atorvastatin 80mg daily  - q8hr stroke neuro checks and vitals  - MRI Brain without contrast completed: Multiple acute infarcts involving the left thalamus, occipital and temporal lobes consistent with acute embolic ischemic infarction. No evidence of hemorrhage.  - Stroke Code HCT Results: as above  - Stroke Code CTA Results: as above  - Stroke education  - TTE nl EF, LVH  - s/p ILR    #HTN  - SBP Goal -160  - gradual BP lowering    #HLD  - high intensity statin  - LDL results: 169    #Nutrition/Fluids/Electrolytes   - replete K<4 and Mg <2  - Diet: DASH, Regular, GERD.   - IVF: N/A      #Prediabetes  - A1C results: 6    DVT Prophylaxis  - Lovenox sq for DVT prophylaxis   - SCDs for DVT prophylaxis       Chart and notes personally reviewed.  Care Discussed with Consultants/Other Providers/ Housestaff [ x] YES [ ] NO   Radiology, labs, old records personally reviewed.    discussed w/ housestaff, nursing, case management, neuro team    Attestation Statements:    Attestation Statements:  Risk Statement (NON-critical care).     On this date of service, level of risk to patient is considered: High.     Due to: acute stroke,  neuro checks, telemetry monitoring.    Time-based billing (NON-critical care).     55 minutes spent on total encounter. The necessity of the time spent during the encounter on this date of service was due to:     time spent on review of labs, imaging studies, old records, obtaining history, personally examining patient, counselling and communicating with patient/ family, entering orders for medications/tests/etc, discussions with other health care providers, documentation in electronic health records, independent interpretation of labs, imaging/procedure results and care coordination.     4YO male with PMHx of HLD, GERD, TIA 2020 as double vision presented with R sided perioral numbness for last 4 days and R thumb numbness starting 1 day prior to admission.   MRI resulted multiple scattered L hemispheric ischemic strokes, likely cardioembolic, patient is admitted for further work up and management.     #Multiple scattered acute ischemic stroke of L hemispheric, L thalamus, L occipital, L temporooccipital not on AP or AC before admission, possibly cardioembolic source.   - was loaded with dapt in ED.   - continue aspirin 81mg and plavix 75mg daily  - continue atorvastatin 80mg daily  - q8hr stroke neuro checks and vitals  - MRI Brain without contrast completed: Multiple acute infarcts involving the left thalamus, occipital and temporal lobes consistent with acute embolic ischemic infarction. No evidence of hemorrhage.  - Stroke Code HCT Results: as above  - Stroke Code CTA Results: as above  - Stroke education  - TTE nl EF, LVH  - s/p ILR    #HTN  - SBP Goal -160  - gradual BP lowering    #HLD  - high intensity statin  - LDL results: 169    #Nutrition/Fluids/Electrolytes   - replete K<4 and Mg <2  - Diet: DASH, Regular, GERD.   - IVF: N/A    #Prediabetes  - A1C results: 6    DVT Prophylaxis  - Lovenox sq for DVT prophylaxis   - SCDs for DVT prophylaxis       Chart and notes personally reviewed.  Care Discussed with Consultants/Other Providers/ Housestaff [ x] YES [ ] NO   Radiology, labs, old records personally reviewed.    discussed w/ housestaff, nursing, case management, neuro team    Attestation Statements:    Attestation Statements:  Risk Statement (NON-critical care).     On this date of service, level of risk to patient is considered: High.     Due to: acute stroke,  neuro checks, telemetry monitoring.    Time-based billing (NON-critical care).     55 minutes spent on total encounter. The necessity of the time spent during the encounter on this date of service was due to:     time spent on review of labs, imaging studies, old records, obtaining history, personally examining patient, counselling and communicating with patient/ family, entering orders for medications/tests/etc, discussions with other health care providers, documentation in electronic health records, independent interpretation of labs, imaging/procedure results and care coordination.     4YO male with PMHx of HLD, GERD, TIA 2020 as double vision presented with R sided perioral numbness for last 4 days and R thumb numbness starting 1 day prior to admission.   MRI resulted multiple scattered L hemispheric ischemic strokes, likely cardioembolic, patient is admitted for further work up and management.     #Multiple scattered acute ischemic stroke of L hemispheric, L thalamus, L occipital, L temporooccipital not on AP or AC before admission, possibly cardioembolic source.   - was loaded with dapt in ED.   - continue aspirin 81mg and plavix 75mg daily  - continue atorvastatin 80mg daily  - q8hr stroke neuro checks and vitals  - MRI Brain without contrast completed: Multiple acute infarcts involving the left thalamus, occipital and temporal lobes consistent with acute embolic ischemic infarction. No evidence of hemorrhage.  - Stroke Code HCT Results: as above  - Stroke Code CTA Results: as above  - Stroke education  - TTE nl EF, LVH  - s/p ILR  - no driving until cleared by ophtho    #HTN  - SBP Goal -160  - gradual BP lowering    #HLD  - high intensity statin  - LDL results: 169    #Nutrition/Fluids/Electrolytes   - replete K<4 and Mg <2  - Diet: DASH, Regular, GERD.   - IVF: N/A    #Prediabetes  - A1C results: 6    DVT Prophylaxis  - Lovenox sq for DVT prophylaxis   - SCDs for DVT prophylaxis       Chart and notes personally reviewed.  Care Discussed with Consultants/Other Providers/ Housestaff [ x] YES [ ] NO   Radiology, labs, old records personally reviewed.    discussed w/ housestaff, nursing, case management, neuro team    Attestation Statements:    Attestation Statements:  Risk Statement (NON-critical care).     On this date of service, level of risk to patient is considered: High.     Due to: acute stroke,  neuro checks, telemetry monitoring.    Time-based billing (NON-critical care).     55 minutes spent on total encounter. The necessity of the time spent during the encounter on this date of service was due to:     time spent on review of labs, imaging studies, old records, obtaining history, personally examining patient, counselling and communicating with patient/ family, entering orders for medications/tests/etc, discussions with other health care providers, documentation in electronic health records, independent interpretation of labs, imaging/procedure results and care coordination.

## 2023-06-27 NOTE — DISCHARGE NOTE NURSING/CASE MANAGEMENT/SOCIAL WORK - PATIENT PORTAL LINK FT
You can access the FollowMyHealth Patient Portal offered by [facility] by registering at the following website: [email or web address]. By joining [email or web address] FollowMyHealth portal, you will also be able to view your health information using other applications (apps) compatible with our system.

## 2023-06-27 NOTE — DISCHARGE NOTE PROVIDER - NSDCFUSCHEDAPPT_GEN_ALL_CORE_FT
[facility]  [facility] [address] [facility]  Scheduled Appointment: 07/19/2023

## 2023-06-27 NOTE — CONSULT NOTE ADULT - SUBJECTIVE AND OBJECTIVE BOX
NICOALS SWEET  73y  Male    Patient is a 73y old  Male who presents with a chief complaint of Left PCA Stroke (27 Jun 2023 10:30)      HPI:  74yo male PMHx HLD, GERD and ?TIA in 2020 while in [place]("double vision") presenting with right sided perioral numbness for last 4 days. Patient reports undergoing root canal w/ possible dental implant placed. After the procedure patient reports feeling some numbness in his right lower lips which evolved over the last days to the upper lip and right side lower jaw area. This morning, patient reports feeling some numbness in his right thumb area. Denies any other symptoms. No weakness of the face, no head or facial pain, no difficulty speaking, no focal weakness, no difficulty ambulating or falls. No nausea or vomiting. Reports feeling fine otherwise.  In ED, vitals: /91 HR 66 RR 18 T 98  CTH: acute left thalamus infarct  CTA: no LVO or significant stenosis - prelim read    Patient admitted under neurology for stroke work up.  (25 Jun 2023 17:41)    S: Patient was examined and seen at bedside. This morning pt is resting comfortably in bed and reports no new issues or overnight events. No complaints, feels better  Denies CP, SOB, N/V/D/C/AP, cough, F, chills, dizziness, new focal weakness, HA, vision changes, dysuria, or urinary symptoms, blood in stool.  ROS: all other systems reviewed and are negative    PAST MEDICAL & SURGICAL HISTORY:  Hyperlipidemia      H/O hernia repair      S/P cataract surgery        SOCIAL HISTORY:  Tobacco: negative  Illicit drugs: negative  Alcohol: social  Family history reviewed and otherwise non-contributory No clotting disorders, CVAs at early age.  ALLERGIES: NKDA    MEDICATIONS  (STANDING):  acyclovir Topical 5% Ointment 1 Application(s) Topical four times a day  aspirin  chewable 81 milliGRAM(s) Oral daily  atorvastatin 80 milliGRAM(s) Oral at bedtime  clopidogrel Tablet 75 milliGRAM(s) Oral daily  enoxaparin Injectable 40 milliGRAM(s) SubCutaneous every 24 hours  multivitamin 1 Tablet(s) Oral daily  pantoprazole    Tablet 40 milliGRAM(s) Oral before breakfast  valACYclovir 2000 milliGRAM(s) Oral two times a day    MEDICATIONS  (PRN):    Home Medications:          Vital Signs Last 24 Hrs  T(C): 35.8 (27 Jun 2023 13:11), Max: 36.7 (26 Jun 2023 16:06)  T(F): 96.4 (27 Jun 2023 13:11), Max: 98 (26 Jun 2023 16:06)  HR: 66 (27 Jun 2023 13:11) (60 - 78)  BP: 153/91 (27 Jun 2023 13:11) (125/70 - 153/91)  BP(mean): --  RR: 20 (27 Jun 2023 13:11) (18 - 20)  SpO2: 95% (26 Jun 2023 16:06) (95% - 95%)    Parameters below as of 26 Jun 2023 16:06  Patient On (Oxygen Delivery Method): room air      CAPILLARY BLOOD GLUCOSE          General: NAD. Looks stated age.  HEENT: clean oropharynx, EOMI, no LAD  Neck: trachea midline, no thyromegaly  CV: nl S1 S2; no m/r/g  Resp: decreased breath sounds at base  GI: NT/ND/S +BS  MS: no clubbing/cyanosis/edema, +pulses  Neuro: motor, sensory intact; + reflexes  Skin: no rashes, nl turgor  Psychiatric: AA0x3 w/ intact insight and judgement    tele: SR, nonspecific changes (on my own evaluation of tele monitor)        LABS:                        14.2   7.11  )-----------( 186      ( 27 Jun 2023 06:01 )             40.2     06-27    142  |  106  |  22<H>  ----------------------------<  106<H>  3.7   |  26  |  1.1    Ca    8.6      27 Jun 2023 06:01  Phos  4.6     06-27  Mg     1.9     06-27    TPro  7.0  /  Alb  4.5  /  TBili  0.4  /  DBili  x   /  AST  21  /  ALT  19  /  AlkPhos  88  06-25    LIVER FUNCTIONS - ( 25 Jun 2023 14:22 )  Alb: 4.5 g/dL / Pro: 7.0 g/dL / ALK PHOS: 88 U/L / ALT: 19 U/L / AST: 21 U/L / GGT: x           CARDIAC MARKERS ( 25 Jun 2023 14:22 )  x     / <0.01 ng/mL / x     / x     / x          PT/INR - ( 25 Jun 2023 14:22 )   PT: 11.00 sec;   INR: 0.97 ratio         PTT - ( 25 Jun 2023 14:22 )  PTT:26.6 sec  Urinalysis Basic - ( 27 Jun 2023 06:01 )    Color: x / Appearance: x / SG: x / pH: x  Gluc: 106 mg/dL / Ketone: x  / Bili: x / Urobili: x   Blood: x / Protein: x / Nitrite: x   Leuk Esterase: x / RBC: x / WBC x   Sq Epi: x / Non Sq Epi: x / Bacteria: x            EKG - SR, nonspecific changes (my read)  Chart and consultant noted personally reviewed.  Care Discussed with Consultants/Other Providers/ Housestaff [ x] YES [ ] NO   Radiology, labs, old records personally reviewed.           NICOLAS SWEET  73y  Male    Patient is a 73y old  Male who presents with a chief complaint of Left PCA Stroke (27 Jun 2023 10:30)      HPI:  72yo male PMHx HLD, GERD and ?TIA in 2020 while in [place]("double vision") presenting with right sided perioral numbness for last 4 days. Patient reports undergoing root canal w/ possible dental implant placed. After the procedure patient reports feeling some numbness in his right lower lips which evolved over the last days to the upper lip and right side lower jaw area. This morning, patient reports feeling some numbness in his right thumb area. Denies any other symptoms. No weakness of the face, no head or facial pain, no difficulty speaking, no focal weakness, no difficulty ambulating or falls. No nausea or vomiting. Reports feeling fine otherwise.  In ED, vitals: /91 HR 66 RR 18 T 98  CTH: acute left thalamus infarct  CTA: no LVO or significant stenosis - prelim read    Patient admitted under neurology for stroke work up.  (25 Jun 2023 17:41)    S: Patient was examined and seen at bedside. This morning pt is resting comfortably in bed and reports no new issues or overnight events. No complaints, feels better. +Cold sore.  Denies CP, SOB, N/V/D/C/AP, cough, F, chills, dizziness, new focal weakness, HA, vision changes, dysuria, or urinary symptoms, blood in stool.  ROS: all other systems reviewed and are negative    PAST MEDICAL & SURGICAL HISTORY:  Hyperlipidemia      H/O hernia repair      S/P cataract surgery        SOCIAL HISTORY:  Tobacco: negative  Illicit drugs: negative  Alcohol: social  Family history reviewed and otherwise non-contributory No clotting disorders, CVAs at early age.  ALLERGIES: NKDA    MEDICATIONS  (STANDING):  acyclovir Topical 5% Ointment 1 Application(s) Topical four times a day  aspirin  chewable 81 milliGRAM(s) Oral daily  atorvastatin 80 milliGRAM(s) Oral at bedtime  clopidogrel Tablet 75 milliGRAM(s) Oral daily  enoxaparin Injectable 40 milliGRAM(s) SubCutaneous every 24 hours  multivitamin 1 Tablet(s) Oral daily  pantoprazole    Tablet 40 milliGRAM(s) Oral before breakfast  valACYclovir 2000 milliGRAM(s) Oral two times a day    MEDICATIONS  (PRN):    Home Medications:          Vital Signs Last 24 Hrs  T(C): 35.8 (27 Jun 2023 13:11), Max: 36.7 (26 Jun 2023 16:06)  T(F): 96.4 (27 Jun 2023 13:11), Max: 98 (26 Jun 2023 16:06)  HR: 66 (27 Jun 2023 13:11) (60 - 78)  BP: 153/91 (27 Jun 2023 13:11) (125/70 - 153/91)  BP(mean): --  RR: 20 (27 Jun 2023 13:11) (18 - 20)  SpO2: 95% (26 Jun 2023 16:06) (95% - 95%)    Parameters below as of 26 Jun 2023 16:06  Patient On (Oxygen Delivery Method): room air      CAPILLARY BLOOD GLUCOSE          General: NAD. Looks stated age.  HEENT: clean oropharynx, EOMI, no LAD. +lip cold sore  Neck: trachea midline, no thyromegaly  CV: nl S1 S2; no m/r/g  Resp: decreased breath sounds at base  GI: NT/ND/S +BS  MS: no clubbing/cyanosis/edema, +pulses  Neuro: motor, sensory intact; + reflexes  Skin: no rashes, nl turgor  Psychiatric: AA0x3 w/ intact insight and judgement    tele: SR, nonspecific changes (on my own evaluation of tele monitor)        LABS:                        14.2   7.11  )-----------( 186      ( 27 Jun 2023 06:01 )             40.2     06-27    142  |  106  |  22<H>  ----------------------------<  106<H>  3.7   |  26  |  1.1    Ca    8.6      27 Jun 2023 06:01  Phos  4.6     06-27  Mg     1.9     06-27    TPro  7.0  /  Alb  4.5  /  TBili  0.4  /  DBili  x   /  AST  21  /  ALT  19  /  AlkPhos  88  06-25    LIVER FUNCTIONS - ( 25 Jun 2023 14:22 )  Alb: 4.5 g/dL / Pro: 7.0 g/dL / ALK PHOS: 88 U/L / ALT: 19 U/L / AST: 21 U/L / GGT: x           CARDIAC MARKERS ( 25 Jun 2023 14:22 )  x     / <0.01 ng/mL / x     / x     / x          PT/INR - ( 25 Jun 2023 14:22 )   PT: 11.00 sec;   INR: 0.97 ratio         PTT - ( 25 Jun 2023 14:22 )  PTT:26.6 sec  Urinalysis Basic - ( 27 Jun 2023 06:01 )    Color: x / Appearance: x / SG: x / pH: x  Gluc: 106 mg/dL / Ketone: x  / Bili: x / Urobili: x   Blood: x / Protein: x / Nitrite: x   Leuk Esterase: x / RBC: x / WBC x   Sq Epi: x / Non Sq Epi: x / Bacteria: x            EKG - SR, nonspecific changes (my read)  Chart and consultant noted personally reviewed.  Care Discussed with Consultants/Other Providers/ Housestaff [ x] YES [ ] NO   Radiology, labs, old records personally reviewed.

## 2023-06-27 NOTE — DISCHARGE NOTE PROVIDER - PROVIDER TOKENS
PROVIDER:[TOKEN:[[id number]],FOLLOWUP:[1 month]],PROVIDER:[TOKEN:[[id number]],FOLLOWUP:[2 weeks]]

## 2023-06-27 NOTE — DISCHARGE NOTE PROVIDER - CARE PROVIDER_API CALL
Dorothy Huber  Neurology  [address] [place] [id number]  Phone: [phone]  Fax: [phone]  Follow Up Time: 1 month    Nathaly Oro  Cardiac Electrophysiology  [address] [place] [id number]  Phone: [phone]  Fax: [phone]  Follow Up Time: 2 weeks

## 2023-06-27 NOTE — DISCHARGE NOTE PROVIDER - HOSPITAL COURSE
Pt is a 72 yo M with PMH of HTN, HLD, prior vertical diplopia (treated with prednisone while in [place] and it improved, uncertain if imaging showed a stroke at that time), who presented with right perioral and right thumb hypoesthesia. NIHSS 2 for right homonymous hemianopia.     Impr: scattered left PCA territory acute ischemic strokes (thalamus, occipital/temporal region)  CTA head/neck with left P2 stenosis vs occlusion with distal reconstitution  TTE with EF 60-65%  Etiology: likely to ICAD, however may also consider cardioembolic  ILR placed.   PTA: none-> DAPT x 90 days then ASA monotherapy and high intensity statin  Discussed driving restrictions    < from: MR Head No Cont (06.25.23 @ 20:56) >    IMPRESSION:    Multiple acute infarcts involving the left thalamus, occipital and   temporal lobes consistent with acute embolic ischemic infarction. No   evidence of hemorrhage.    --- End of Report ---    < end of copied text >    < from: CT Angio Neck w/ IV Cont (06.25.23 @ 16:56) >    IMPRESSION:    CT PERFUSION:  No evidence of perfusion abnormality.    CTA HEAD:  Severe focal stenosis of the left P2 segment of the PCA without vessel.    CTA NECK:  No evidence of carotid or vertebral artery stenosis.    Age indeterminate mild compression deformity of the T6 vertebral body.    --- End of Report ---    < end of copied text >    patient is being discharged with DAPT for 90 days and high intensity statin.        Pt is a 74 yo M with PMH of HTN, HLD, prior vertical diplopia (treated with prednisone while in [place] and it improved, uncertain if imaging showed a stroke at that time), who presented with right perioral and right thumb hypoesthesia. NIHSS 2 for right homonymous hemianopia.     Impr: scattered left PCA territory acute ischemic strokes (thalamus, occipital/temporal region)  CTA head/neck with left P2 stenosis vs occlusion with distal reconstitution  TTE with EF 60-65%  Etiology: likely to ICAD, however may also consider cardioembolic  ILR placed.   PTA: none-> DAPT x 90 days then ASA monotherapy and high intensity statin  Discussed driving restrictions    MR Head No Cont (06.25.23 @ 20:56)    Multiple acute infarcts involving the left thalamus, occipital and   temporal lobes consistent with acute embolic ischemic infarction. No   evidence of hemorrhage.    CT PERFUSION:  No evidence of perfusion abnormality.    CTA HEAD:  Severe focal stenosis of the left P2 segment of the PCA without vessel.    CTA NECK:  No evidence of carotid or vertebral artery stenosis.  Age indeterminate mild compression deformity of the T6 vertebral body.    Patient is being discharged with DAPT for 90 days and high intensity statin.        ***  MR Head No Cont (06.25.23 @ 20:56)    Multiple acute infarcts involving the left thalamus, occipital and   temporal lobes consistent with acute embolic ischemic infarction. No   evidence of hemorrhage.    CT PERFUSION:  No evidence of perfusion abnormality.    CTA HEAD:  Severe focal stenosis of the left P2 segment of the PCA without vessel.    CTA NECK:  No evidence of carotid or vertebral artery stenosis.  Age indeterminate mild compression deformity of the T6 vertebral body.    Patient is being discharged with DAPT for 90 days and high intensity statin.   ***    Stroke attending attestation:  Pt is a 74 yo M with PMH of HTN, HLD, prior vertical diplopia (treated with prednisone while in [place] and it improved, uncertain if imaging showed a stroke at that time), who presented with right perioral and right thumb hypoesthesia.    Impr: scattered left PCA territory acute ischemic strokes (thalamus, occipital/temporal region)  CTA head/neck with left P2 stenosis vs occlusion with distal reconstitution  Etiology: likely to ICAD, however may also consider cardioembolic  S/p ILR placement. TTE with EF 60-65%  PTA: none-> DAPT x 90 days then ASA monotherapy and high intensity statin  Discussed driving restrictions  F/u with stroke neurology, ophthalmology and EP at [facility]. HPI:  74yo male PMHx HLD, GERD and ?TIA in 2020 while in [place] ("double vision") presenting with right sided perioral numbness for last 4 days. Patient reports undergoing root canal w/ possible dental implant placed. After the procedure patient reports feeling some numbness in his right lower lips which evolved over the last days to the upper lip and right side lower jaw area. This morning, patient reports feeling some numbness in his right thumb area. Denies any other symptoms. No weakness of the face, no head or facial pain, no difficulty speaking, no focal weakness, no difficulty ambulating or falls. No nausea or vomiting. Reports feeling fine otherwise.  In ED, vitals: /91 HR 66 RR 18 T 98  CTH: acute left thalamus infarct  CTA: no LVO or significant stenosis - prelim read  Patient admitted under neurology for stroke work up.      CC: Perioral and tongue numbness  Initial NIHSS: 1                      tPA : no  Current Exam Deficits: R sided perioral numbness, R thumb numbness  SBP Goal: 120-160    Workup:   [x] HCT: Acute Left Thalamic infarct  [x] CTA head/Neck: No vascular occlusion, aneurysm or malformation  [x] CTP: No perfusion abnormality  [x] MRI brain w/out: L thalamus, occipital, temporal  [x] TTE with bubble: EF 65%, G1DD  [x] PT/OT/PMnR: Home and OP PT/OT  [x] ILR - done  [x] Diet: regular/thin liquid, DASH/TLC  [x] Dispo: Home PT  [x] Etiology most likely ICAD of L PCA ds     CORE MEASURES:  [6.0] A1c     [169] LDL    [6.94]TSH  [ ] AC   [X] DVT ppx      Meds: [X ] ASA  [X ] Plavix  [ X]Atorvastatin     MEDICATIONS  (STANDING):  acyclovir Topical 5% Ointment 1 Application(s) Topical four times a day  aspirin  chewable 81 milliGRAM(s) Oral daily  atorvastatin 80 milliGRAM(s) Oral at bedtime  clopidogrel Tablet 75 milliGRAM(s) Oral daily  enoxaparin Injectable 40 milliGRAM(s) SubCutaneous every 24 hours  multivitamin 1 Tablet(s) Oral daily  pantoprazole    Tablet 40 milliGRAM(s) Oral before breakfast  valACYclovir 2000 milliGRAM(s) Oral two times a day      Physical exam at discharge:  General: Well-developed, well nourished, in no acute distress.  Eyes: Conjunctiva and sclera clear.  Neurologic:  - Mental Status:  Alert, awake, oriented to person, place, and time; Speech is fluent with intact naming, repetition, and comprehension; Good overall fund of knowledge;   - Cranial Nerves II-XII:    II:  Visual fields are full to confrontation; Pupils are equal, round, and reactive to light;  III, IV, VI:  Extraocular movements are intact without nystagmus.  V:  decreased right sided facial sensation in v2,v3 perioral area  VII:  Face is symmetric with normal eye closure and smile  VIII:  Hearing is intact to finger rub.  IX, X:  Uvula is midline and soft palate rises symmetrically  XI:  Head turning and shoulder shrug are intact.  XII:  Tongue protrudes in the midline.  - Motor:  Strength is 5/5 throughout.  There is no prontator drift.  Normal muscle bulk and tone throughout.  - Reflexes:  2+ and symmetric at the biceps, triceps, brachioradialis, knees, and ankles.  Plantar responses flexor.  - Sensory:  Intact throughout to vibration, mild numbness in right thumb, R perioral numbness and tongue  - Coordination:  Finger-nose-finger  without dysmetria.  Rapid alternating hand movements intact.  - Gait: Narrow based    NIHSS on discharge: 1    New medications on discharge: Plavix 75 for 90 days in total, Atorvastatin 80 mg  Labs to be followed up: None  Imaging to be done as outpatient: None  Further outpatient workup: f/u w neurologist, ophthalmologist in OP settings, PT/OT

## 2023-06-28 LAB — VIT B12 SERPL-MCNC: 300 PG/ML — SIGNIFICANT CHANGE UP (ref 232–1245)

## 2023-06-29 LAB — LIDOCAIN SERPL-MCNC: 58.5 NMOL/L — SIGNIFICANT CHANGE UP

## 2023-06-30 DIAGNOSIS — Z86.73 PERSONAL HISTORY OF TRANSIENT ISCHEMIC ATTACK (TIA), AND CEREBRAL INFARCTION W/OUT RESIDUAL DEFICITS: ICD-10-CM

## 2023-07-19 ENCOUNTER — APPOINTMENT (OUTPATIENT)
Dept: ELECTROPHYSIOLOGY | Facility: CLINIC | Age: 73
End: 2023-07-19

## 2023-08-21 ENCOUNTER — APPOINTMENT (OUTPATIENT)
Dept: CARDIOLOGY | Facility: CLINIC | Age: 73
End: 2023-08-21

## 2024-08-30 NOTE — DISCHARGE NOTE NURSING/CASE MANAGEMENT/SOCIAL WORK - NSDCPETBCESMAN_GEN_ALL_CORE
[FreeTextEntry1] : last blood work d/w the pt at length  If you are a smoker, it is important for your health to stop smoking. Please be aware that second hand smoke is also harmful.
